# Patient Record
Sex: MALE | Race: WHITE | NOT HISPANIC OR LATINO | URBAN - METROPOLITAN AREA
[De-identification: names, ages, dates, MRNs, and addresses within clinical notes are randomized per-mention and may not be internally consistent; named-entity substitution may affect disease eponyms.]

---

## 2023-01-01 VITALS
RESPIRATION RATE: 15 BRPM | OXYGEN SATURATION: 92 % | HEIGHT: 70 IN | SYSTOLIC BLOOD PRESSURE: 147 MMHG | TEMPERATURE: 98 F | HEART RATE: 97 BPM | WEIGHT: 240.08 LBS | DIASTOLIC BLOOD PRESSURE: 78 MMHG

## 2023-01-01 LAB
ALBUMIN SERPL ELPH-MCNC: 3.8 G/DL — SIGNIFICANT CHANGE UP (ref 3.4–5)
ALP SERPL-CCNC: 69 U/L — SIGNIFICANT CHANGE UP (ref 40–120)
ALT FLD-CCNC: 14 U/L — SIGNIFICANT CHANGE UP (ref 12–42)
ANION GAP SERPL CALC-SCNC: 13 MMOL/L — SIGNIFICANT CHANGE UP (ref 9–16)
APPEARANCE UR: CLEAR — SIGNIFICANT CHANGE UP
APTT BLD: 49.9 SEC — HIGH (ref 27.5–35.5)
AST SERPL-CCNC: 29 U/L — SIGNIFICANT CHANGE UP (ref 15–37)
BACTERIA # UR AUTO: PRESENT /HPF
BASOPHILS # BLD AUTO: 0.05 K/UL — SIGNIFICANT CHANGE UP (ref 0–0.2)
BASOPHILS NFR BLD AUTO: 0.6 % — SIGNIFICANT CHANGE UP (ref 0–2)
BILIRUB SERPL-MCNC: 1 MG/DL — SIGNIFICANT CHANGE UP (ref 0.2–1.2)
BILIRUB UR-MCNC: NEGATIVE — SIGNIFICANT CHANGE UP
BUN SERPL-MCNC: 23 MG/DL — SIGNIFICANT CHANGE UP (ref 7–23)
CALCIUM SERPL-MCNC: 9.5 MG/DL — SIGNIFICANT CHANGE UP (ref 8.5–10.5)
CHLORIDE SERPL-SCNC: 103 MMOL/L — SIGNIFICANT CHANGE UP (ref 96–108)
CK SERPL-CCNC: 1785 U/L — HIGH (ref 39–308)
CO2 SERPL-SCNC: 24 MMOL/L — SIGNIFICANT CHANGE UP (ref 22–31)
COLOR SPEC: YELLOW — SIGNIFICANT CHANGE UP
CREAT SERPL-MCNC: 1.28 MG/DL — SIGNIFICANT CHANGE UP (ref 0.5–1.3)
DIFF PNL FLD: ABNORMAL
EGFR: 59 ML/MIN/1.73M2 — LOW
EOSINOPHIL # BLD AUTO: 0.11 K/UL — SIGNIFICANT CHANGE UP (ref 0–0.5)
EOSINOPHIL NFR BLD AUTO: 1.3 % — SIGNIFICANT CHANGE UP (ref 0–6)
ETHANOL SERPL-MCNC: <3 MG/DL — SIGNIFICANT CHANGE UP
FLUAV AG NPH QL: SIGNIFICANT CHANGE UP
FLUBV AG NPH QL: SIGNIFICANT CHANGE UP
GLUCOSE SERPL-MCNC: 129 MG/DL — HIGH (ref 70–99)
GLUCOSE UR QL: NEGATIVE — SIGNIFICANT CHANGE UP
GRAN CASTS # UR COMP ASSIST: ABNORMAL /LPF
HCT VFR BLD CALC: 41.4 % — SIGNIFICANT CHANGE UP (ref 39–50)
HGB BLD-MCNC: 14 G/DL — SIGNIFICANT CHANGE UP (ref 13–17)
IMM GRANULOCYTES NFR BLD AUTO: 0.9 % — SIGNIFICANT CHANGE UP (ref 0–0.9)
INR BLD: 1.43 — HIGH (ref 0.88–1.16)
KETONES UR-MCNC: 15 MG/DL
LACTATE SERPL-SCNC: 1.9 MMOL/L — SIGNIFICANT CHANGE UP (ref 0.4–2)
LEUKOCYTE ESTERASE UR-ACNC: NEGATIVE — SIGNIFICANT CHANGE UP
LYMPHOCYTES # BLD AUTO: 0.51 K/UL — LOW (ref 1–3.3)
LYMPHOCYTES # BLD AUTO: 6.3 % — LOW (ref 13–44)
MAGNESIUM SERPL-MCNC: 2.2 MG/DL — SIGNIFICANT CHANGE UP (ref 1.6–2.6)
MANUAL SMEAR VERIFICATION: SIGNIFICANT CHANGE UP
MCHC RBC-ENTMCNC: 29.4 PG — SIGNIFICANT CHANGE UP (ref 27–34)
MCHC RBC-ENTMCNC: 33.8 GM/DL — SIGNIFICANT CHANGE UP (ref 32–36)
MCV RBC AUTO: 86.8 FL — SIGNIFICANT CHANGE UP (ref 80–100)
MONOCYTES # BLD AUTO: 0.89 K/UL — SIGNIFICANT CHANGE UP (ref 0–0.9)
MONOCYTES NFR BLD AUTO: 10.9 % — SIGNIFICANT CHANGE UP (ref 2–14)
NEUTROPHILS # BLD AUTO: 6.53 K/UL — SIGNIFICANT CHANGE UP (ref 1.8–7.4)
NEUTROPHILS NFR BLD AUTO: 80 % — HIGH (ref 43–77)
NITRITE UR-MCNC: NEGATIVE — SIGNIFICANT CHANGE UP
NRBC # BLD: 0 /100 WBCS — SIGNIFICANT CHANGE UP (ref 0–0)
NT-PROBNP SERPL-SCNC: 2634 PG/ML — HIGH
PH UR: 5.5 — SIGNIFICANT CHANGE UP (ref 5–8)
PLAT MORPH BLD: NORMAL — SIGNIFICANT CHANGE UP
PLATELET # BLD AUTO: 168 K/UL — SIGNIFICANT CHANGE UP (ref 150–400)
POTASSIUM SERPL-MCNC: 3.6 MMOL/L — SIGNIFICANT CHANGE UP (ref 3.5–5.3)
POTASSIUM SERPL-SCNC: 3.6 MMOL/L — SIGNIFICANT CHANGE UP (ref 3.5–5.3)
PROT SERPL-MCNC: 7.8 G/DL — SIGNIFICANT CHANGE UP (ref 6.4–8.2)
PROT UR-MCNC: 100 MG/DL
PROTHROM AB SERPL-ACNC: 16.7 SEC — HIGH (ref 10.5–13.4)
RBC # BLD: 4.77 M/UL — SIGNIFICANT CHANGE UP (ref 4.2–5.8)
RBC # FLD: 13.2 % — SIGNIFICANT CHANGE UP (ref 10.3–14.5)
RBC BLD AUTO: NORMAL — SIGNIFICANT CHANGE UP
RBC CASTS # UR COMP ASSIST: < 5 /HPF — SIGNIFICANT CHANGE UP
RSV RNA NPH QL NAA+NON-PROBE: SIGNIFICANT CHANGE UP
SARS-COV-2 RNA SPEC QL NAA+PROBE: DETECTED
SODIUM SERPL-SCNC: 140 MMOL/L — SIGNIFICANT CHANGE UP (ref 132–145)
SP GR SPEC: >=1.03 — SIGNIFICANT CHANGE UP (ref 1–1.03)
TROPONIN I, HIGH SENSITIVITY RESULT: 167 NG/L — HIGH
UROBILINOGEN FLD QL: 1 E.U./DL — SIGNIFICANT CHANGE UP
WBC # BLD: 8.16 K/UL — SIGNIFICANT CHANGE UP (ref 3.8–10.5)
WBC # FLD AUTO: 8.16 K/UL — SIGNIFICANT CHANGE UP (ref 3.8–10.5)
WBC UR QL: < 5 /HPF — SIGNIFICANT CHANGE UP

## 2023-01-01 PROCEDURE — 72125 CT NECK SPINE W/O DYE: CPT | Mod: 26,MH

## 2023-01-01 PROCEDURE — 74176 CT ABD & PELVIS W/O CONTRAST: CPT | Mod: 26,MH

## 2023-01-01 PROCEDURE — 70450 CT HEAD/BRAIN W/O DYE: CPT | Mod: 26,MH

## 2023-01-01 PROCEDURE — 99285 EMERGENCY DEPT VISIT HI MDM: CPT | Mod: CS

## 2023-01-01 PROCEDURE — 70486 CT MAXILLOFACIAL W/O DYE: CPT | Mod: 26,MH

## 2023-01-01 NOTE — ED ADULT NURSE NOTE - NSINTERVENTIONOPT_GEN_ALL_ED
Discharge Call Back    Person Contacted: mother    Juan Arroyo. This is Zulay Lovell RN calling from [facility].    Melba Lopez NP wanted me to call you to see how you are doing.    Patient Condition: Improved    Did your discharge instructions answer all your questions yes    If applicable, have you made a follow-up appointment or received a call for follow up? No    Recommendation: follow-up as needed    Do you have any questions about your care in the Urgent Care, pain control or discharge instructions? No     Hourly Rounding/Enhanced Supervision

## 2023-01-01 NOTE — ED ADULT NURSE NOTE - NSIMPLEMENTINTERV_GEN_ALL_ED
Implemented All Fall with Harm Risk Interventions:  [place] to call system. Call bell, personal items and telephone within reach. Instruct patient to call for assistance. Room bathroom lighting operational. Non-slip footwear when patient is off stretcher. Physically safe environment: no spills, clutter or unnecessary equipment. Stretcher in lowest position, wheels locked, appropriate side rails in place. Provide visual cue, wrist band, yellow gown, etc. Monitor gait and stability. Monitor for mental status changes and reorient to person, place, and time. Review medications for side effects contributing to fall risk. Reinforce activity limits and safety measures with patient and family. Provide visual clues: red socks.

## 2023-01-01 NOTE — ED ADULT TRIAGE NOTE - CHIEF COMPLAINT QUOTE
Pt brought in by EMS for complaint of a fall about 5 hours ago stating his legs gave out and then he couldn't get up. PT reports discomfort to his back and weakness to both his knees downward. Pt reports history of afib and a stroke (7 years ago) and currently on Eliquis. Pt reports hitting the bridge of his nose and his forehead. Denies LOC.

## 2023-01-01 NOTE — ED ADULT NURSE NOTE - OBJECTIVE STATEMENT
fall approx 5 hours ago stating his legs gave out and then he couldn't get up from sitting position. PT reports discomfort to his back and weakness to both his knees downward. Eliquis fro afib. redness noted to bridge of nose and his forehead. Denies head strike /loc/alcohol and drug use

## 2023-01-02 ENCOUNTER — INPATIENT (INPATIENT)
Facility: HOSPITAL | Age: 73
LOS: 2 days | Discharge: HOME | DRG: 871 | End: 2023-01-05
Attending: INTERNAL MEDICINE | Admitting: GENERAL ACUTE CARE HOSPITAL
Payer: MEDICARE

## 2023-01-02 DIAGNOSIS — E11.9 TYPE 2 DIABETES MELLITUS WITHOUT COMPLICATIONS: ICD-10-CM

## 2023-01-02 DIAGNOSIS — I48.20 CHRONIC ATRIAL FIBRILLATION, UNSPECIFIED: ICD-10-CM

## 2023-01-02 DIAGNOSIS — U07.1 COVID-19: ICD-10-CM
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
## 2023-01-02 DIAGNOSIS — I63.9 CEREBRAL INFARCTION, UNSPECIFIED: ICD-10-CM

## 2023-01-02 DIAGNOSIS — M62.82 RHABDOMYOLYSIS: ICD-10-CM

## 2023-01-02 DIAGNOSIS — I25.10 ATHEROSCLEROTIC HEART DISEASE OF NATIVE CORONARY ARTERY WITHOUT ANGINA PECTORIS: ICD-10-CM

## 2023-01-02 DIAGNOSIS — R63.8 OTHER SYMPTOMS AND SIGNS CONCERNING FOOD AND FLUID INTAKE: ICD-10-CM

## 2023-01-02 DIAGNOSIS — I10 ESSENTIAL (PRIMARY) HYPERTENSION: ICD-10-CM

## 2023-01-02 LAB
A1C WITH ESTIMATED AVERAGE GLUCOSE RESULT: 5.9 % — HIGH (ref 4–5.6)
ALBUMIN SERPL ELPH-MCNC: 3.9 G/DL — SIGNIFICANT CHANGE UP (ref 3.3–5)
ALP SERPL-CCNC: 65 U/L — SIGNIFICANT CHANGE UP (ref 40–120)
ALT FLD-CCNC: 12 U/L — SIGNIFICANT CHANGE UP (ref 10–45)
ANION GAP SERPL CALC-SCNC: 13 MMOL/L — SIGNIFICANT CHANGE UP (ref 5–17)
AST SERPL-CCNC: 54 U/L — HIGH (ref 10–40)
BASOPHILS # BLD AUTO: 0.05 K/UL — SIGNIFICANT CHANGE UP (ref 0–0.2)
BASOPHILS NFR BLD AUTO: 0.8 % — SIGNIFICANT CHANGE UP (ref 0–2)
BILIRUB SERPL-MCNC: 0.8 MG/DL — SIGNIFICANT CHANGE UP (ref 0.2–1.2)
BUN SERPL-MCNC: 21 MG/DL — SIGNIFICANT CHANGE UP (ref 7–23)
CALCIUM SERPL-MCNC: 8.9 MG/DL — SIGNIFICANT CHANGE UP (ref 8.4–10.5)
CHLORIDE SERPL-SCNC: 103 MMOL/L — SIGNIFICANT CHANGE UP (ref 96–108)
CK MB BLD-MCNC: 0.14 % — SIGNIFICANT CHANGE UP
CK MB CFR SERPL CALC: 2.2 NG/ML — SIGNIFICANT CHANGE UP (ref 0.5–3.6)
CK MB CFR SERPL CALC: 7.5 NG/ML — HIGH (ref 0–6.7)
CK MB CFR SERPL CALC: 8 NG/ML — HIGH (ref 0–6.7)
CK SERPL-CCNC: 1603 U/L — HIGH (ref 39–308)
CK SERPL-CCNC: 4285 U/L — HIGH (ref 30–200)
CK SERPL-CCNC: 4947 U/L — HIGH (ref 30–200)
CK SERPL-CCNC: 5194 U/L — HIGH (ref 30–200)
CO2 SERPL-SCNC: 22 MMOL/L — SIGNIFICANT CHANGE UP (ref 22–31)
CREAT SERPL-MCNC: 1.04 MG/DL — SIGNIFICANT CHANGE UP (ref 0.5–1.3)
CRP SERPL-MCNC: 37.6 MG/L — HIGH (ref 0–4)
CULTURE RESULTS: SIGNIFICANT CHANGE UP
D DIMER BLD IA.RAPID-MCNC: 311 NG/ML DDU — HIGH
EGFR: 76 ML/MIN/1.73M2 — SIGNIFICANT CHANGE UP
EOSINOPHIL # BLD AUTO: 0.12 K/UL — SIGNIFICANT CHANGE UP (ref 0–0.5)
EOSINOPHIL NFR BLD AUTO: 1.9 % — SIGNIFICANT CHANGE UP (ref 0–6)
ESTIMATED AVERAGE GLUCOSE: 123 MG/DL — HIGH (ref 68–114)
FERRITIN SERPL-MCNC: 290 NG/ML — SIGNIFICANT CHANGE UP (ref 30–400)
GLUCOSE BLDC GLUCOMTR-MCNC: 84 MG/DL — SIGNIFICANT CHANGE UP (ref 70–99)
GLUCOSE BLDC GLUCOMTR-MCNC: 94 MG/DL — SIGNIFICANT CHANGE UP (ref 70–99)
GLUCOSE BLDC GLUCOMTR-MCNC: 98 MG/DL — SIGNIFICANT CHANGE UP (ref 70–99)
GLUCOSE BLDC GLUCOMTR-MCNC: 99 MG/DL — SIGNIFICANT CHANGE UP (ref 70–99)
GLUCOSE SERPL-MCNC: 99 MG/DL — SIGNIFICANT CHANGE UP (ref 70–99)
HCT VFR BLD CALC: 40 % — SIGNIFICANT CHANGE UP (ref 39–50)
HGB BLD-MCNC: 13.3 G/DL — SIGNIFICANT CHANGE UP (ref 13–17)
IMM GRANULOCYTES NFR BLD AUTO: 0.6 % — SIGNIFICANT CHANGE UP (ref 0–0.9)
LYMPHOCYTES # BLD AUTO: 1.31 K/UL — SIGNIFICANT CHANGE UP (ref 1–3.3)
LYMPHOCYTES # BLD AUTO: 20.3 % — SIGNIFICANT CHANGE UP (ref 13–44)
MAGNESIUM SERPL-MCNC: 2 MG/DL — SIGNIFICANT CHANGE UP (ref 1.6–2.6)
MCHC RBC-ENTMCNC: 29 PG — SIGNIFICANT CHANGE UP (ref 27–34)
MCHC RBC-ENTMCNC: 33.3 GM/DL — SIGNIFICANT CHANGE UP (ref 32–36)
MCV RBC AUTO: 87.3 FL — SIGNIFICANT CHANGE UP (ref 80–100)
MONOCYTES # BLD AUTO: 1.09 K/UL — HIGH (ref 0–0.9)
MONOCYTES NFR BLD AUTO: 16.9 % — HIGH (ref 2–14)
NEUTROPHILS # BLD AUTO: 3.85 K/UL — SIGNIFICANT CHANGE UP (ref 1.8–7.4)
NEUTROPHILS NFR BLD AUTO: 59.5 % — SIGNIFICANT CHANGE UP (ref 43–77)
NRBC # BLD: 0 /100 WBCS — SIGNIFICANT CHANGE UP (ref 0–0)
PHOSPHATE SERPL-MCNC: 3 MG/DL — SIGNIFICANT CHANGE UP (ref 2.5–4.5)
PLATELET # BLD AUTO: 163 K/UL — SIGNIFICANT CHANGE UP (ref 150–400)
POTASSIUM SERPL-MCNC: 3.4 MMOL/L — LOW (ref 3.5–5.3)
POTASSIUM SERPL-SCNC: 3.4 MMOL/L — LOW (ref 3.5–5.3)
PROT SERPL-MCNC: 7.1 G/DL — SIGNIFICANT CHANGE UP (ref 6–8.3)
RBC # BLD: 4.58 M/UL — SIGNIFICANT CHANGE UP (ref 4.2–5.8)
RBC # FLD: 13.2 % — SIGNIFICANT CHANGE UP (ref 10.3–14.5)
SODIUM SERPL-SCNC: 138 MMOL/L — SIGNIFICANT CHANGE UP (ref 135–145)
SPECIMEN SOURCE: SIGNIFICANT CHANGE UP
TROPONIN I, HIGH SENSITIVITY RESULT: 391.3 NG/L — HIGH
TROPONIN T SERPL-MCNC: 0.02 NG/ML — HIGH (ref 0–0.01)
TROPONIN T SERPL-MCNC: 0.03 NG/ML — HIGH (ref 0–0.01)
WBC # BLD: 6.46 K/UL — SIGNIFICANT CHANGE UP (ref 3.8–10.5)
WBC # FLD AUTO: 6.46 K/UL — SIGNIFICANT CHANGE UP (ref 3.8–10.5)

## 2023-01-02 PROCEDURE — 99223 1ST HOSP IP/OBS HIGH 75: CPT | Mod: GC

## 2023-01-02 PROCEDURE — 70551 MRI BRAIN STEM W/O DYE: CPT | Mod: 26

## 2023-01-02 RX ORDER — SODIUM CHLORIDE 9 MG/ML
1000 INJECTION, SOLUTION INTRAVENOUS
Refills: 0 | Status: DISCONTINUED | OUTPATIENT
Start: 2023-01-03 | End: 2023-01-03

## 2023-01-02 RX ORDER — SODIUM CHLORIDE 9 MG/ML
1000 INJECTION INTRAMUSCULAR; INTRAVENOUS; SUBCUTANEOUS ONCE
Refills: 0 | Status: COMPLETED | OUTPATIENT
Start: 2023-01-02 | End: 2023-01-02

## 2023-01-02 RX ORDER — AMLODIPINE BESYLATE AND BENAZEPRIL HYDROCHLORIDE 10; 20 MG/1; MG/1
1 CAPSULE ORAL
Qty: 0 | Refills: 0 | DISCHARGE

## 2023-01-02 RX ORDER — ACETAMINOPHEN 500 MG
650 TABLET ORAL EVERY 6 HOURS
Refills: 0 | Status: DISCONTINUED | OUTPATIENT
Start: 2023-01-02 | End: 2023-01-05

## 2023-01-02 RX ORDER — SODIUM CHLORIDE 9 MG/ML
1000 INJECTION, SOLUTION INTRAVENOUS
Refills: 0 | Status: DISCONTINUED | OUTPATIENT
Start: 2023-01-02 | End: 2023-01-05

## 2023-01-02 RX ORDER — METFORMIN HYDROCHLORIDE 850 MG/1
1 TABLET ORAL
Qty: 0 | Refills: 0 | DISCHARGE

## 2023-01-02 RX ORDER — CARVEDILOL PHOSPHATE 80 MG/1
1 CAPSULE, EXTENDED RELEASE ORAL
Qty: 0 | Refills: 0 | DISCHARGE

## 2023-01-02 RX ORDER — POTASSIUM CHLORIDE 20 MEQ
40 PACKET (EA) ORAL ONCE
Refills: 0 | Status: COMPLETED | OUTPATIENT
Start: 2023-01-02 | End: 2023-01-02

## 2023-01-02 RX ORDER — DEXTROSE 50 % IN WATER 50 %
15 SYRINGE (ML) INTRAVENOUS ONCE
Refills: 0 | Status: DISCONTINUED | OUTPATIENT
Start: 2023-01-02 | End: 2023-01-05

## 2023-01-02 RX ORDER — ATORVASTATIN CALCIUM 80 MG/1
10 TABLET, FILM COATED ORAL AT BEDTIME
Refills: 0 | Status: DISCONTINUED | OUTPATIENT
Start: 2023-01-02 | End: 2023-01-03

## 2023-01-02 RX ORDER — SODIUM CHLORIDE 9 MG/ML
1000 INJECTION, SOLUTION INTRAVENOUS
Refills: 0 | Status: DISCONTINUED | OUTPATIENT
Start: 2023-01-02 | End: 2023-01-02

## 2023-01-02 RX ORDER — CLOPIDOGREL BISULFATE 75 MG/1
75 TABLET, FILM COATED ORAL DAILY
Refills: 0 | Status: DISCONTINUED | OUTPATIENT
Start: 2023-01-02 | End: 2023-01-05

## 2023-01-02 RX ORDER — DEXTROSE 50 % IN WATER 50 %
12.5 SYRINGE (ML) INTRAVENOUS ONCE
Refills: 0 | Status: DISCONTINUED | OUTPATIENT
Start: 2023-01-02 | End: 2023-01-05

## 2023-01-02 RX ORDER — ACETAMINOPHEN 500 MG
975 TABLET ORAL ONCE
Refills: 0 | Status: COMPLETED | OUTPATIENT
Start: 2023-01-02 | End: 2023-01-02

## 2023-01-02 RX ORDER — ENOXAPARIN SODIUM 100 MG/ML
40 INJECTION SUBCUTANEOUS EVERY 24 HOURS
Refills: 0 | Status: DISCONTINUED | OUTPATIENT
Start: 2023-01-02 | End: 2023-01-02

## 2023-01-02 RX ORDER — DEXTROSE 50 % IN WATER 50 %
25 SYRINGE (ML) INTRAVENOUS ONCE
Refills: 0 | Status: DISCONTINUED | OUTPATIENT
Start: 2023-01-02 | End: 2023-01-05

## 2023-01-02 RX ORDER — CLOPIDOGREL BISULFATE 75 MG/1
1 TABLET, FILM COATED ORAL
Qty: 0 | Refills: 0 | DISCHARGE

## 2023-01-02 RX ORDER — FUROSEMIDE 40 MG
1 TABLET ORAL
Qty: 0 | Refills: 0 | DISCHARGE

## 2023-01-02 RX ORDER — CARVEDILOL PHOSPHATE 80 MG/1
25 CAPSULE, EXTENDED RELEASE ORAL ONCE
Refills: 0 | Status: DISCONTINUED | OUTPATIENT
Start: 2023-01-02 | End: 2023-01-02

## 2023-01-02 RX ORDER — CARVEDILOL PHOSPHATE 80 MG/1
12.5 CAPSULE, EXTENDED RELEASE ORAL ONCE
Refills: 0 | Status: COMPLETED | OUTPATIENT
Start: 2023-01-02 | End: 2023-01-02

## 2023-01-02 RX ORDER — INFLUENZA VIRUS VACCINE 15; 15; 15; 15 UG/.5ML; UG/.5ML; UG/.5ML; UG/.5ML
0.7 SUSPENSION INTRAMUSCULAR ONCE
Refills: 0 | Status: DISCONTINUED | OUTPATIENT
Start: 2023-01-02 | End: 2023-01-05

## 2023-01-02 RX ORDER — APIXABAN 2.5 MG/1
1 TABLET, FILM COATED ORAL
Qty: 0 | Refills: 0 | DISCHARGE

## 2023-01-02 RX ORDER — APIXABAN 2.5 MG/1
5 TABLET, FILM COATED ORAL EVERY 12 HOURS
Refills: 0 | Status: DISCONTINUED | OUTPATIENT
Start: 2023-01-02 | End: 2023-01-05

## 2023-01-02 RX ORDER — SODIUM CHLORIDE 9 MG/ML
1000 INJECTION, SOLUTION INTRAVENOUS
Refills: 0 | Status: DISCONTINUED | OUTPATIENT
Start: 2023-01-02 | End: 2023-01-03

## 2023-01-02 RX ORDER — GLUCAGON INJECTION, SOLUTION 0.5 MG/.1ML
1 INJECTION, SOLUTION SUBCUTANEOUS ONCE
Refills: 0 | Status: DISCONTINUED | OUTPATIENT
Start: 2023-01-02 | End: 2023-01-05

## 2023-01-02 RX ADMIN — CARVEDILOL PHOSPHATE 12.5 MILLIGRAM(S): 80 CAPSULE, EXTENDED RELEASE ORAL at 01:37

## 2023-01-02 RX ADMIN — APIXABAN 5 MILLIGRAM(S): 2.5 TABLET, FILM COATED ORAL at 19:39

## 2023-01-02 RX ADMIN — SODIUM CHLORIDE 75 MILLILITER(S): 9 INJECTION, SOLUTION INTRAVENOUS at 09:24

## 2023-01-02 RX ADMIN — SODIUM CHLORIDE 500 MILLILITER(S): 9 INJECTION INTRAMUSCULAR; INTRAVENOUS; SUBCUTANEOUS at 00:58

## 2023-01-02 RX ADMIN — Medication 975 MILLIGRAM(S): at 00:40

## 2023-01-02 RX ADMIN — ATORVASTATIN CALCIUM 10 MILLIGRAM(S): 80 TABLET, FILM COATED ORAL at 21:43

## 2023-01-02 RX ADMIN — CLOPIDOGREL BISULFATE 75 MILLIGRAM(S): 75 TABLET, FILM COATED ORAL at 21:43

## 2023-01-02 RX ADMIN — ENOXAPARIN SODIUM 40 MILLIGRAM(S): 100 INJECTION SUBCUTANEOUS at 07:57

## 2023-01-02 RX ADMIN — Medication 40 MILLIEQUIVALENT(S): at 07:55

## 2023-01-02 RX ADMIN — SODIUM CHLORIDE 125 MILLILITER(S): 9 INJECTION, SOLUTION INTRAVENOUS at 11:53

## 2023-01-02 NOTE — H&P ADULT - ASSESSMENT
73 y/o Male PMHx CVA (R MCA), HTN, HLD, Afib (on eliquis), CAD s/p stent (2014), ?CHF (on lasix) who presents after a fall. Found to have sepsis 2/2 COVID along with rhabdomyolysis. Admitted to [facility] for further management. 73 y/o Male PMHx CVA (R MCA in 2013), HTN, HLD, Afib (on eliquis), CAD s/p stent (2014), ?CHF (on lasix) who presents after a fall. Found to have sepsis 2/2 COVID along with rhabdomyolysis. Admitted to [facility] for further management. 73 y/o Male PMHx CVA (R MCA in 2013), HTN, HLD, Afib (on eliquis), CAD s/p stent (2014), ?CHF (on lasix) who presents after a fall. Found to have sepsis 2/2 COVID along with rhabdomyolysis after a fall. Admitted to [facility] for further management.

## 2023-01-02 NOTE — ED PROVIDER NOTE - ENMT NEGATIVE STATEMENT, MLM
Per Padmini at [facility] lab anaerobe/Aerobe culture taken on 02/12/21 grew p acnes.  Dr. Phelps notified.     Attempted to contact patient to inform of 10 day dose Augmentin and verify pharmacy.   Ears: no ear pain and no hearing problems. Nose: no nasal congestion and no nasal drainage. Mouth/Throat: no dysphagia, no hoarseness and no throat pain. Neck: no lumps, no pain, no stiffness and no swollen glands.

## 2023-01-02 NOTE — ED PROVIDER NOTE - OBJECTIVE STATEMENT
71 yo M, hx of MCA CVA, on eloquis, HTN, HLD, CHF, on carvedilol, metformin, statin, lasix, compliant, presents s/p fall. lives with sister, fully independent with no residual CVA deficits, ambulates indepenedently, presents s/p fall. per sister, fell onto buttock earlier in the day, and sat on the floor for hours, trying to get up. patient was resting against a wooden buffet furniture and he resisted calling 911. Per sister no changes in ms, no N/V/D, no syncope. patient notes feeling generalized weakness, fatigue, and no appetite since waking up this morning. denies cp, syncope, dizziness, headache, neck pain or stiffness, denies cough, fever, chills or UTI symptoms. no recent abx or admissions. PMD Dr Michael Willis. Dr Jerson Morris cardiology. 71 yo M, hx of MCA CVA, on eloquis, HTN, HLD, CHF, on carvedilol, metformin, statin, lasix, compliant, presents s/p fall. lives with sister, fully independent with no residual CVA deficits, ambulates indepenedently, presents s/p fall. per sister, fell onto buttock earlier in the day, and sat on the floor for hours, trying to get up. patient was resting against a wooden buffet furniture and he resisted calling 911. Per sister no changes in ms, no N/V/D, no syncope. patient notes feeling generalized weakness, fatigue, and no appetite since waking up this morning. denies cp, syncope, dizziness, headache, neck pain or stiffness, denies cough, fever, chills or UTI symptoms. no recent abx or admissions. PMD Dr Michael Willis. Dr Jerson Morris cardiology. from [facility]. Patient is fully vaccinated for COVID.  Per sister no recent hospital admissions last admission was in 2013 when he had the CVA.  Patient notes he is compliant with all medication. Patient denies alcohol, denies drugs or smoking.

## 2023-01-02 NOTE — H&P ADULT - PROBLEM SELECTOR PLAN 7
CVA in 2013 to the R MCA region. No gross focal deficits appreciated on exam aside from 4+/5 LUE strength testing.   - c/w plavix 75mg daily  - c/w statin therapeutic interchange CVA in 2013 to the R MCA region. No gross focal deficits appreciated on exam aside from 4+/5 LUE strength testing. Reports to taking plavix 75mg daily since his stroke, not on aspirin 81mg  - discuss plavix continuation with PCP  - c/w statin therapeutic interchange CVA in 2013 to the R MCA region. No gross focal deficits appreciated on exam aside from 4+/5 LUE strength testing. Reports to taking plavix 75mg daily and pravastatin 40mg since his stroke, not on aspirin 81mg  - c/w plavix 75mg daily  - c/w statin therapeutic interchange  - will need PCP clarification on plavix given 9 years since last stroke. He is also not on PPI.

## 2023-01-02 NOTE — H&P ADULT - PROBLEM SELECTOR PLAN 8
-147/67-78. Reports to taking coreg (cannot recall dose) and lasix 40mg. Per surescripts, also recently prescribed norvasc 5mg-benazepril 40mg daily.   - will hold for now i/s/o sepsis and normotensive pressures  - if becomes persistently hypertensive will resume coreg at 12.5mg BID  - formal med rec needed -147/67-78. Reports to taking coreg (cannot recall dose) and lasix 40mg. Per med rec at pharmacy takes amlodipine 5-40 q24h, coreg 25mg BID, and lasix 40mg daily  - will hold for now i/s/o sepsis and normotensive pressures  - if becomes persistently hypertensive will resume coreg at half-home dose -147/67-78. Reports to taking coreg 25mg BID and lasix 40mg. Per med rec at pharmacy takes amlodipine 5- benazepril 40 q24h, coreg 25mg BID, and lasix 40mg daily  - will hold for now i/s/o sepsis and normotensive pressures  - if becomes persistently hypertensive will resume coreg at half-home dose

## 2023-01-02 NOTE — H&P ADULT - ATTENDING COMMENTS
Patient is a 73 y/o Male PMHx CVA (R MCA in 2013), HTN, HLD, Afib (on eliquis), CAD s/p stent (2014), ?CHF (on lasix) who presents after a fall. Found to have sepsis 2/2 COVID along with rhabdomyolysis after a fall. Admitted to [facility] for further management.    #Sepsis 2/2 COVID-19 - patient meeting sepsis criteria, found to be COVID-19 positive. CXR shows no consolidation, UA not suggestive of UTI. Patient has had vaccine x2, will monitor and start remdesivir/decadron if SpO2 <94%.   #Rhabdomyolysis - patient with fall at home and down for at least 4 hours, now with elevated CK to 4K. Will continue fluids @125cc/hr for now. POCUS shows LV function is good, will f/u official limited TTE and monitor for fluid overload given history of CAD and ?CHF.   #Fall at home w/o LOC - CT head negative for acute pathology, May be related to dehydration from COIVD infection and sepsis. Fall precautions.   #CVA - history of CVA in past, now with worsening dysmetria of LUE. CT head negative for acute stroke. Neurology consulted, may be related to recrudesce from prior stroke. Will obtain MRI head, c/w Eliquis and statin    Additional workup and management as documented by resident above.

## 2023-01-02 NOTE — PATIENT PROFILE ADULT - FALL HARM RISK - HARM RISK INTERVENTIONS
Assistance with ambulation/Assistance OOB with selected safe patient handling equipment/Communicate Risk of Fall with Harm to all staff/Discuss with provider need for PT consult/Monitor gait and stability/Reinforce activity limits and safety measures with patient and family/Tailored Fall Risk Interventions/Visual Cue: Yellow wristband and red socks/Bed in lowest position, wheels locked, appropriate side rails in place/Call bell, personal items and telephone in reach/Instruct patient to call for assistance before getting out of bed or chair/Non-slip footwear when patient is out of bed/[place] to call system/Physically safe environment - no spills, clutter or unnecessary equipment/Purposeful Proactive Rounding/Room/bathroom lighting operational, light cord in reach

## 2023-01-02 NOTE — H&P ADULT - NSCORESITESY/N_GEN_A_CORE_RD
89y Male [facility] severe R hip pain and inability to ambulate after an unwitnessed falll. Pt is a poor historian and hx was largely obtaeind from wife at bedside. Wife reports that patient had an unwitneseed fall monday morning and has had difficulty ambulating since that time. He is unable to bear weight on the right lower extremity. Patient walks with walker. History unobtainable from patient.     PAST MEDICAL & SURGICAL HISTORY:  History of polymyalgia rheumatica: this is why patient is on chronic prednisone  HTN (hypertension)  BPH (benign prostatic hyperplasia)  Atrial fibrillation  H/O cardiac pacemaker  S/P CABG x 5    MEDICATIONS  (STANDING):  acetaminophen  IVPB .. 1000 milliGRAM(s) IV Intermittent every 6 hours  lactated ringers Bolus 500 milliLiter(s) IV Bolus once  lactated ringers. 1000 milliLiter(s) (75 mL/Hr) IV Continuous <Continuous>  lidocaine   Patch 2 Patch Transdermal daily  sodium chloride 0.9% Bolus 500 milliLiter(s) IV Bolus once    MEDICATIONS  (PRN):    Allergies    No Known Allergies    Intolerances        T(C): 37.2 (09-02-20 @ 23:00), Max: 37.8 (09-02-20 @ 10:58)  HR: 94 (09-03-20 @ 00:45) (72 - 103)  BP: 97/54 (09-03-20 @ 00:15) (86/54 - 142/63)  RR: 9 (09-03-20 @ 00:45) (9 - 36)  SpO2: 93% (09-03-20 @ 00:45) (91% - 99%)  Wt(kg): --    PE  Gen: AOx1, confused at baseline per wife (worsened as of recent)   R LE:  Skin intact; No ecchymosis/soft tissue swelling  Compartments soft; grimace with palpation about the hip and with log roll/heel strike, no grimace to palpation of the rest of the extremityh  Minimally responsive to commands and was unable to assess SLR/motor sensory  Dp 2+    LLE/ B/L UE  No grimace to bony palpation throughout ; Good ROM w/o pain; Exam Unremarkable    Imaging:  XR demonstrating Right acetabular fracture, with read showing possible femoral head fx    89y Male with acetabular fracture  - Pain control  - PT  - TTWB RLE  - No plan for operative intervention at this time  - Admit for inability to ambulate  -Outpatient follow up with attending on call, Dr. Lopez in 1-2 weeks following discharge. Please call office for an appointment.  -Ortho stable for discharge. Please reconsult as needed No

## 2023-01-02 NOTE — H&P ADULT - PROBLEM SELECTOR PLAN 3
PT eval  - plan as above PT eval  - plan as above  - fall precautions Fell on his left side w/ exam notable for dysmetria. CT head negative +chronic R MCA stroke but concern for possible new ischemia.   - stroke team recs appreciated, currently outside of window for tPA  - plan as above  - fall precautions Fell on his left side w/ exam notable for dysmetria. CT head negative +chronic R MCA stroke but uncertain about possible new ischemia.   - stroke team recs appreciated, currently outside of window for tPA  - ordered MR brain w/o (short stroke protocol)  - plan as above  - fall precautions

## 2023-01-02 NOTE — CONSULT NOTE ADULT - SUBJECTIVE AND OBJECTIVE BOX
Patient is a 72y old  Male who presents with a chief complaint of COVID, Rhabdo, Fall (2023 07:15)        HPI:  73 y/o Male PMHx CVA (R MCA in ), HTN, HLD, Afib (on eliquis), CAD s/p stent (), ?CHF (on lasix) who presents after a fall. Patient lives with his sister who reports he is fully independent. Patient reports between 2-3pm yesterday when standing upright from his chair to use the bathroom, his torso and legs gave out and landed on his left side. Says he was down for about 4 hours and had difficulty getting up to call 911. He waited for his sister (who lives with him) to return to the apartment before bringing him to the hospital. Prior to and during the fall, he denied having chest pain, palpitations, and room spinning sensation. He also denies loss of consciousness and had full recollection of events. No history of prior falls. Says he is typically ambulatory and walks 1.5 miles per day. Does not use devices to assist with ambulation. Of note, has been experiencing upper nasal congestion for the past 3 weeks. No recent travels and sick contacts aside from seeing his sister on a daily basis. Reports COVID booster vaccination 2 weeks ago, but unsure if it was for the recent variant. On further ROS, he also admits to poor PO intake since yesterday morning. Last hospitalization in  for CVA.     ED Course  Vitals: 100.9F (rectal), HR 75-97, -147/67-78, 98% RA, RR 16  Labs: notable for 16.9% monocytes, K 3.4, AST 54, CRP 37.6, D-dimer 311, CK  1785 < 4285, trop-T 0.03, CKMB 7.5; BNP 2634; UA ketones 15, SG >1.03, large blood but no RBCs; +COVID  Imagin) CTH - no intracranial hemorrhage or fracture, old R MCA territory infarct  2) CT cervical - no acute cervical spine fx or traumatic subluxation, mlutilevel cervical spondylosis w/ varying degrees of central canal and neural foraminal stenosis  3) CT maxillofacial - no acute maxillofacial or mandibular fx, L maxillary sinus and ethmoid air cell mucosal thickening  4) CT abd/pelvis w/o - cholelithiasis w/o evidence of acute cholecystitis, colonic diverticulosis, small umbilical hernia and b/l inguinal hernia containing only fat  EKG: afib HR 83, QTc 465 w/ unifocal PVCs  Management: 1L NS bolus, APAP 975mg PO x1, coreg 12.5mg PO x1    ED provider discussed with cards on-call Dr. Loaiza about trop-I elev ation who believes elevation is not cardiac etiology (2023 07:15)      PAST MEDICAL & SURGICAL HISTORY:  Hypertension      CVA (cerebrovascular accident)      Diabetes      HLD (hyperlipidemia)      CAD (coronary artery disease)          MEDICATIONS  (STANDING):  apixaban 5 milliGRAM(s) Oral every 12 hours  atorvastatin 10 milliGRAM(s) Oral at bedtime  dextrose 5%. 1000 milliLiter(s) (50 mL/Hr) IV Continuous <Continuous>  dextrose 5%. 1000 milliLiter(s) (100 mL/Hr) IV Continuous <Continuous>  dextrose 50% Injectable 25 Gram(s) IV Push once  dextrose 50% Injectable 12.5 Gram(s) IV Push once  dextrose 50% Injectable 25 Gram(s) IV Push once  glucagon  Injectable 1 milliGRAM(s) IntraMuscular once  influenza  Vaccine (HIGH DOSE) 0.7 milliLiter(s) IntraMuscular once  lactated ringers. 1000 milliLiter(s) (75 mL/Hr) IV Continuous <Continuous>    MEDICATIONS  (PRN):  acetaminophen     Tablet .. 650 milliGRAM(s) Oral every 6 hours PRN Temp greater or equal to 38C (100.4F), Mild Pain (1 - 3)  dextrose Oral Gel 15 Gram(s) Oral once PRN Blood Glucose LESS THAN 70 milliGRAM(s)/deciliter       FAMILY HISTORY:    CBC Full  -  ( 2023 05:09 )  WBC Count : 6.46 K/uL  RBC Count : 4.58 M/uL  Hemoglobin : 13.3 g/dL  Hematocrit : 40.0 %  Platelet Count - Automated : 163 K/uL  Mean Cell Volume : 87.3 fl  Mean Cell Hemoglobin : 29.0 pg  Mean Cell Hemoglobin Concentration : 33.3 gm/dL  Auto Neutrophil # : 3.85 K/uL  Auto Lymphocyte # : 1.31 K/uL  Auto Monocyte # : 1.09 K/uL  Auto Eosinophil # : 0.12 K/uL  Auto Basophil # : 0.05 K/uL  Auto Neutrophil % : 59.5 %  Auto Lymphocyte % : 20.3 %  Auto Monocyte % : 16.9 %  Auto Eosinophil % : 1.9 %  Auto Basophil % : 0.8 %          138  |  103  |  21  ----------------------------<  99  3.4<L>   |  22  |  1.04    Ca    8.9      2023 05:09  Phos  3.0       Mg     2.0         TPro  7.1  /  Alb  3.9  /  TBili  0.8  /  DBili  x   /  AST  54<H>  /  ALT  12  /  AlkPhos  65        Urinalysis Basic - ( 2023 21:37 )    Color: Yellow / Appearance: Clear / SG: >=1.030 / pH: x  Gluc: x / Ketone: 15 mg/dL  / Bili: NEGATIVE / Urobili: 1.0 E.U./dL   Blood: x / Protein: 100 mg/dL / Nitrite: NEGATIVE   Leuk Esterase: NEGATIVE / RBC: < 5 /HPF / WBC < 5 /HPF   Sq Epi: x / Non Sq Epi: x / Bacteria: Present /HPF          Radiology :     < from: CT Head No Cont (23 @ 23:47) >  ACC: [id number] EXAM:  CT BRAIN                          PROCEDURE DATE:  2023          INTERPRETATION:  CT HEAD WITHOUT CONTRAST    INDICATIONS: Fall    TECHNIQUE: Serial axial images were obtained from the skull base to the   vertex without the use of intravenous contrast. Sagittal and coronal   reformatted images were created from the axial data set. Images were   reviewed in the bone, brain and subdural windows.    COMPARISON: None.    FINDINGS: Evaluation is partially limited due to motion artifact.    INTRA-AXIAL: No intracranial mass effect, acute hemorrhage, midline shift   or acute transcortical infarct is seen. Small area of   encephalomalacia/gliosis in the right insular region, consistent with   prior infarct. Mild patchy periventricular white matter hypodensities,   nonspecific but most likely the result of chronic microvascular disease..  EXTRA-AXIAL: No extra-axial fluid collection is present.  VENTRICLES AND SULCI: No hydrocephalus. Mild ex vacuo dilatation of the   right lateral ventricle and Sylvian fissure adjacent to chronic right MCA   territory infarct.  VISUALIZED SINUSES: Mucosal thickening in the left maxillary sinus and   bilateral ethmoid air cells. No air-fluid levels.  VISUALIZED MASTOIDS: Clear.  CALVARIUM: No fracture.  MISCELLANEOUS: None.    IMPRESSION:  No intracranial hemorrhage or calvarial fracture.    Old right MCA territory infarct, as above.      < from: CT Cervical Spine No Cont (23 @ 23:49) >  ACC: [id number] EXAM:  CT CERVICAL SPINE                          PROCEDURE DATE:  2023          INTERPRETATION:  PROCEDURE: CT CERVICAL SPINE WITHOUT CONTRAST    INDICATION: Fall    TECHNIQUE: Multiple axial sections were obtained from the midorbits to   the sternoclavicular joint. Sagittal and coronal reformats were obtained   from the axial data set. The images were reviewed in soft tissue and bone   windows.    COMPARISON: None    FINDINGS:    The normal cervical lordosis is maintained. Mild anterolisthesis of C5 on   C6.    The osseous structures are intact without acute fracture. The vertebral   body heights are preserved. Multilevel degenerative changes including   anterior osteophytes, endplate sclerosis, subchondral cystic change,   facet and uncovertebral arthropathy are present.    Multilevel disc degeneration, worst at C6-7. Multilevel disc bulges with   superimposed disc protrusions and posterior osseous ridging causing mild   central canal stenosis at C3-4 and mild tomoderate central canal   stenosis at C4-5. Evaluation below C4-5 is limited due to streak artifact   from shoulder positioning. Facet and uncovertebral joint arthropathy   causes varying levels of neural foraminal narrowing, severe bilaterally   at C4-5.    No prevertebral soft tissue swelling.      IMPRESSION:  No acute cervical spine fracture or traumatic subluxation.    Multilevel cervical spondylosis with varying degrees of central canal and   neural foraminal stenosis, as above.      < from: CT Abdomen and Pelvis No Cont (23 @ 23:48) >  ACC: [id number] EXAM:  CT ABDOMEN AND PELVIS                          PROCEDURE DATE:  2023    ******PRELIMINARY REPORT******      ******PRELIMINARY REPORT******           INTERPRETATION:  [facility] RADIOLOGIST PRELIMINARY REPORT      Initial report created on 2023 12:41:07 AM EST  PROCEDURE INFORMATION:  Exam: CT Abdomen And Pelvis Without Contrast  Exam date and time: 2023 11:25 PM  Age: 72 years old  Clinical indication: Fall    TECHNIQUE:  Imaging protocol: Computed tomography of the abdomen and pelvis without  contrast.    COMPARISON:  No relevant prior studies available.    FINDINGS:  Liver: Normal. No mass.  Gallbladder and bile ducts: Stones in the gallbladder. No gallbladder wall  thickening. No pericholecystic fluid.  Pancreas: Normal. No ductal dilation.  Spleen: Normal. No splenomegaly.  Adrenal glands: Normal. No mass.  Kidneys and ureters: Normal. No hydronephrosis.  Stomach and bowel: Colonic diverticulosis. No findings to suggest acute  diverticulitis. No areas of bowel wall thickening in the colon. No   evidence of  obstruction.  Appendix: Normal appendix is seen. No evidence of acute appendicitis.    Intraperitoneal space: No free fluidor fluid collections. No inflammatory  changes. No free air.  Vasculature: Unremarkable. No abdominal aortic aneurysm.  Lymph nodes: Unremarkable. No enlarged lymph nodes.  Urinary bladder: Unremarkable as visualized.  Reproductive: Unremarkable as visualized.  Bones/joints: Degenerative changes in the spine. No evidence of fracture.  Degenerative changes in the spine. No evidence of fracture.  Soft tissues: Small umbilical hernia containing only fat. No evidence of   bowel  herniation. No umbilical fluid collection. Bilateral inguinal hernias  containing only fat.    IMPRESSION:  1. No evidence of visceral organ or vascular injury.  2. Cholelithiasis without evidence of acute cholecystitis.  3. Colonic diverticulosis.No pericolonic inflammatory changes to suggest   acute  diverticulitis.  4. Small umbilical hernia containing only fat. No evidence of bowel   herniation.  No umbilical fluid collection.  5. Bilateral inguinal hernias containing only fat.                  Vital Signs Last 24 Hrs  T(C): 36.6 (2023 05:08), Max: 38.3 (2023 23:41)  T(F): 97.8 (2023 05:08), Max: 100.9 (2023 23:41)  HR: 67 (2023 05:08) (67 - 97)  BP: 106/58 (2023 05:08) (106/58 - 147/78)  BP(mean): --  RR: 17 (2023 05:08) (15 - 18)  SpO2: 97% (2023 05:08) (92% - 98%)    Parameters below as of 2023 05:08  Patient On (Oxygen Delivery Method): room air            REVIEW OF SYSTEMS:  per hpi         Physical Exam:  on COVID isolation , in accordance with current standards limiting patient contact , please refer to exam performed on 2023      Head: mild bruising at the glabella   Eyes: PERRL, EOMI, anicteric sclera  ENT: dry mucus membranes, intermittent dry cough  Neck: supple; no JVD   Respiratory: CTA B/L; no W/R/R  Cardiac: +S1/S2; irregularly irregular with HR 60s; no M/R/G  Gastrointestinal: soft, NT/ND; no rebound or guarding; +BSx4  Back: spine midline, no bony tenderness  Extremities: WWP, b/l nonpitting edema with chronic venous stasis changes, b/l knee and L lateral malleoli skin tear s/p falls  Musculoskeletal: NROM x4; no joint swelling, tenderness or erythema; 5/5 strength testing of RUE/LLE/RLE but 4/5 LUE  Vascular: 2+ radial,  DP/PT pulses B/L  Dermatologic: warm and dry, skin findings as noted in extremity section above  Neurologic: AAOx3; +hard of hearing  Psychiatric: affect and characteristics of appearance, verbalizations, behaviors are appropriate          PM&R Impression :     1) deconditioned    2) no focal weakness      Recommendations / Plan :     1) Physical / Occupational therapy focusing on therapeutic exercises , equipment evaluation , bed mobility/transfer out of bed evaluation , progressive ambulation with assistive devices prn .    2) Current disposition plan recommendation  : pending functional progress

## 2023-01-02 NOTE — ED PROVIDER NOTE - SKIN WOUND TYPE
Patient with superficial abrasions to bilateral patellas, no streaking region demarcated.  Superficial and scant linear abrasion or ecchymosis to upper back, bilateral posterior olecranon regions with erythema with scant abrasion.  No streaking no crepitus patient has full range of motion motion to both elbows, no deformities, full range of motion to both lower knees, with no deformities and no edema.

## 2023-01-02 NOTE — H&P ADULT - NSHPSOCIALHISTORY_GEN_ALL_CORE
Prior smoker, quit 1993. Denies alcohol and recreational drug use. Lives with sister, reports to being independent of ADLs.

## 2023-01-02 NOTE — CONSULT NOTE ADULT - SUBJECTIVE AND OBJECTIVE BOX
**STROKE CONSULT NOTE**    Last known well time/Time of onset of symptoms:    HPI: 72y Male with PMHx of     T(C): 36.6 (01-02-23 @ 05:08), Max: 38.3 (01-01-23 @ 23:41)  HR: 67 (01-02-23 @ 05:08) (67 - 97)  BP: 106/58 (01-02-23 @ 05:08) (106/58 - 147/78)  RR: 17 (01-02-23 @ 05:08) (15 - 18)  SpO2: 97% (01-02-23 @ 05:08) (92% - 98%)    PAST MEDICAL & SURGICAL HISTORY:  Hypertension      CVA (cerebrovascular accident)      Diabetes      HLD (hyperlipidemia)      CAD (coronary artery disease)          FAMILY HISTORY:      SOCIAL HISTORY:   Patient lives alone    ROS:   Constitutional: No fever, weight loss or fatigue  Eyes: No eye pain, visual disturbances, or discharge  ENMT:  No difficulty hearing, tinnitus; No sinus or throat pain  Neck: No pain or stiffness  Respiratory: No cough, wheezing, chills or hemoptysis  Cardiovascular: No chest pain, palpitations, shortness of breath, or leg swelling  Gastrointestinal: No abdominal pain. No nausea, vomiting or hematemesis; No diarrhea or constipation. Nohematochezia.  Genitourinary: No dysuria, frequency, hematuria or incontinence  Neurological: As per HPI  Skin: No itching, burning, rashes or lesions   Endocrine: No heat or cold intolerance; No hair loss  Musculoskeletal: No joint pain or swelling; No muscle, back or extremity pain  Heme/Lymph: No easy bruising or bleeding gums    MEDICATIONS  (STANDING):  apixaban 5 milliGRAM(s) Oral every 12 hours  atorvastatin 10 milliGRAM(s) Oral at bedtime  clopidogrel Tablet 75 milliGRAM(s) Oral daily  dextrose 5%. 1000 milliLiter(s) (50 mL/Hr) IV Continuous <Continuous>  dextrose 5%. 1000 milliLiter(s) (100 mL/Hr) IV Continuous <Continuous>  dextrose 50% Injectable 25 Gram(s) IV Push once  dextrose 50% Injectable 12.5 Gram(s) IV Push once  dextrose 50% Injectable 25 Gram(s) IV Push once  glucagon  Injectable 1 milliGRAM(s) IntraMuscular once  influenza  Vaccine (HIGH DOSE) 0.7 milliLiter(s) IntraMuscular once  lactated ringers. 1000 milliLiter(s) (125 mL/Hr) IV Continuous <Continuous>    MEDICATIONS  (PRN):  acetaminophen     Tablet .. 650 milliGRAM(s) Oral every 6 hours PRN Temp greater or equal to 38C (100.4F), Mild Pain (1 - 3)  dextrose Oral Gel 15 Gram(s) Oral once PRN Blood Glucose LESS THAN 70 milliGRAM(s)/deciliter    Allergies    No Known Allergies    Intolerances      Vital Signs Last 24 Hrs  T(C): 36.6 (02 Jan 2023 05:08), Max: 38.3 (01 Jan 2023 23:41)  T(F): 97.8 (02 Jan 2023 05:08), Max: 100.9 (01 Jan 2023 23:41)  HR: 67 (02 Jan 2023 05:08) (67 - 97)  BP: 106/58 (02 Jan 2023 05:08) (106/58 - 147/78)  BP(mean): --  RR: 17 (02 Jan 2023 05:08) (15 - 18)  SpO2: 97% (02 Jan 2023 05:08) (92% - 98%)    Parameters below as of 02 Jan 2023 05:08  Patient On (Oxygen Delivery Method): room air        Physical exam:  Constitutional: No acute distress, conversant  Eyes: Anicteric sclerae, moist conjunctivae, see below for CNs  Neck: trachea midline, FROM, supple, no thyromegaly or lymphadenopathy  Cardiovascular: Regular rate and rhythm    Neurologic:  -Mental status: Awake, alert, oriented to person, age, and month. Speech is fluent with intact naming, no dysarthria. Somewhat impaired remote memory. Follows commands. Attention/concentration intact. Fund of knowledge appropriate.  -Cranial nerves:   II: Visual fields are full to confrontation.  III, IV, VI: Extraocular movements are intact without nystagmus. Pupils equally round and reactive to light  V:  Facial sensation V1-V3 equal and intact   VII: Face is symmetric   VIII: hard of hearing  Motor: Normal bulk and tone. Slight left arm drift without much pronation, says it "feels heavy" strength 4+/5. Right arm 5/5, right leg 5/5, left leg 5/5.  Sensation: Intact to light touch bilaterally. No neglect or extinction on double simultaneous testing.  Coordination: No dysmetria on finger-to-nose   Gait: Narrow gait and steady    NIHSS: 1    Fingerstick Blood Glucose: CAPILLARY BLOOD GLUCOSE      POCT Blood Glucose.: 99 mg/dL (02 Jan 2023 12:31)    LABS:                        13.3   6.46  )-----------( 163      ( 02 Jan 2023 05:09 )             40.0     01-02    138  |  103  |  21  ----------------------------<  99  3.4<L>   |  22  |  1.04    Ca    8.9      02 Jan 2023 05:09  Phos  3.0     01-02  Mg     2.0     01-02    TPro  7.1  /  Alb  3.9  /  TBili  0.8  /  DBili  x   /  AST  54<H>  /  ALT  12  /  AlkPhos  65  01-02    PT/INR - ( 01 Jan 2023 21:37 )   PT: 16.7 sec;   INR: 1.43          PTT - ( 01 Jan 2023 21:37 )  PTT:49.9 sec  CARDIAC MARKERS ( 02 Jan 2023 10:26 )  x     / 0.02 ng/mL / 5194 U/L / x     / 8.0 ng/mL  CARDIAC MARKERS ( 02 Jan 2023 05:06 )  x     / 0.03 ng/mL / 4285 U/L / x     / 7.5 ng/mL  CARDIAC MARKERS ( 02 Jan 2023 01:23 )  x     / x     / 1603 U/L / x     / 2.2 ng/mL  CARDIAC MARKERS ( 01 Jan 2023 22:57 )  x     / x     / 1785 U/L / x     / x          Urinalysis Basic - ( 01 Jan 2023 21:37 )    Color: Yellow / Appearance: Clear / SG: >=1.030 / pH: x  Gluc: x / Ketone: 15 mg/dL  / Bili: NEGATIVE / Urobili: 1.0 E.U./dL   Blood: x / Protein: 100 mg/dL / Nitrite: NEGATIVE   Leuk Esterase: NEGATIVE / RBC: < 5 /HPF / WBC < 5 /HPF   Sq Epi: x / Non Sq Epi: x / Bacteria: Present /HPF        RADIOLOGY & ADDITIONAL STUDIES:    < from: CT Head No Cont (01.01.23 @ 23:47) >  IMPRESSION:  No intracranial hemorrhage or calvarial fracture.    Old right MCA territory infarct, as above.    < end of copied text >    -----------------------------------------------------------------------------------------------------------------  IV-tPA (Y/N):    No  Reason IV-tPA not given: outside window    **STROKE CONSULT NOTE**    Last known well time/Time of onset of symptoms: yesterday afternoon    HPI: 72y Male with PMHx of CVA (R MCA in 2013 per CT scan but per pt did not have any weakness at the time), HTN, HLD, Afib (on eliquis), CAD s/p stent (2014), ?CHF (on lasix) who presented to [facility] after a fall. Pt states that his legs gave out and he fell on his left side. Says he was down for about 4 hours and had difficulty getting up to call 911. Denies any loss of consciousness or head strike. Says he is ambulatory at baseline and never had any weakness from his stroke in 2013, but that he had heart failure at the time and someone told him he also had a stroke.    Pt admitted to medicine service for SIRS from COVID and also rhabdomyolysis. Stroke team consulted for left arm weakness that was new since yesterday. Per patient, he insists that he never had weakness on his left side before, again says he was asymptomatic from his stroke in 2013. He says he has been compliant with Eliquis twice a day and he also takes daily plavix. His sister at bedside said they had tried some other stroke medication in 2013 that pt was allergic to. Pt states that now he is able to walk without a problem but his right arm still feels weird.     T(C): 36.6 (01-02-23 @ 05:08), Max: 38.3 (01-01-23 @ 23:41)  HR: 67 (01-02-23 @ 05:08) (67 - 97)  BP: 106/58 (01-02-23 @ 05:08) (106/58 - 147/78)  RR: 17 (01-02-23 @ 05:08) (15 - 18)  SpO2: 97% (01-02-23 @ 05:08) (92% - 98%)    PAST MEDICAL & SURGICAL HISTORY:  Hypertension      CVA (cerebrovascular accident)      Diabetes      HLD (hyperlipidemia)      CAD (coronary artery disease)          FAMILY HISTORY:      SOCIAL HISTORY:   Patient lives with sister    ROS:   Eyes: No eye pain, visual disturbances, or discharge  ENMT:  No difficulty hearing, tinnitus; No sinus or throat pain  Neck: No pain or stiffness  Respiratory: No cough, wheezing, chills or hemoptysis  Cardiovascular: No chest pain, palpitation  Gastrointestinal: No abdominal pain. No nausea, vomiting or hematemesis; No diarrhea or constipation. Nohematochezia.  Genitourinary: No dysuria, frequency, hematuria or incontinence  Neurological: As per HPI    MEDICATIONS  (STANDING):  apixaban 5 milliGRAM(s) Oral every 12 hours  atorvastatin 10 milliGRAM(s) Oral at bedtime  clopidogrel Tablet 75 milliGRAM(s) Oral daily  dextrose 5%. 1000 milliLiter(s) (50 mL/Hr) IV Continuous <Continuous>  dextrose 5%. 1000 milliLiter(s) (100 mL/Hr) IV Continuous <Continuous>  dextrose 50% Injectable 25 Gram(s) IV Push once  dextrose 50% Injectable 12.5 Gram(s) IV Push once  dextrose 50% Injectable 25 Gram(s) IV Push once  glucagon  Injectable 1 milliGRAM(s) IntraMuscular once  influenza  Vaccine (HIGH DOSE) 0.7 milliLiter(s) IntraMuscular once  lactated ringers. 1000 milliLiter(s) (125 mL/Hr) IV Continuous <Continuous>    MEDICATIONS  (PRN):  acetaminophen     Tablet .. 650 milliGRAM(s) Oral every 6 hours PRN Temp greater or equal to 38C (100.4F), Mild Pain (1 - 3)  dextrose Oral Gel 15 Gram(s) Oral once PRN Blood Glucose LESS THAN 70 milliGRAM(s)/deciliter    Allergies    No Known Allergies    Intolerances      Vital Signs Last 24 Hrs  T(C): 36.6 (02 Jan 2023 05:08), Max: 38.3 (01 Jan 2023 23:41)  T(F): 97.8 (02 Jan 2023 05:08), Max: 100.9 (01 Jan 2023 23:41)  HR: 67 (02 Jan 2023 05:08) (67 - 97)  BP: 106/58 (02 Jan 2023 05:08) (106/58 - 147/78)  BP(mean): --  RR: 17 (02 Jan 2023 05:08) (15 - 18)  SpO2: 97% (02 Jan 2023 05:08) (92% - 98%)    Parameters below as of 02 Jan 2023 05:08  Patient On (Oxygen Delivery Method): room air        Physical exam:  Constitutional: No acute distress, conversant  Eyes: Anicteric sclerae, moist conjunctivae, see below for CNs  Neck: trachea midline, FROM, supple, no thyromegaly or lymphadenopathy  Cardiovascular: Regular rate and rhythm    Neurologic:  -Mental status: Awake, alert, oriented to person, age, and month. Speech is fluent with intact naming, no dysarthria. Somewhat impaired remote memory. Follows commands. Attention/concentration intact. Fund of knowledge appropriate.  -Cranial nerves:   II: Visual fields are full to confrontation.  III, IV, VI: Extraocular movements are intact without nystagmus. Pupils equally round and reactive to light  V:  Facial sensation V1-V3 equal and intact   VII: Face is symmetric   VIII: hard of hearing  Motor: Normal bulk and tone. Slight left arm drift without much pronation, says it "feels heavy" strength 4+/5. Right arm 5/5, right leg 5/5, left leg 5/5.  Sensation: Intact to light touch bilaterally. No neglect or extinction on double simultaneous testing.  Coordination: No dysmetria on finger-to-nose   Gait: Narrow gait and steady    NIHSS: 1    Fingerstick Blood Glucose: CAPILLARY BLOOD GLUCOSE      POCT Blood Glucose.: 99 mg/dL (02 Jan 2023 12:31)    LABS:                        13.3   6.46  )-----------( 163      ( 02 Jan 2023 05:09 )             40.0     01-02    138  |  103  |  21  ----------------------------<  99  3.4<L>   |  22  |  1.04    Ca    8.9      02 Jan 2023 05:09  Phos  3.0     01-02  Mg     2.0     01-02    TPro  7.1  /  Alb  3.9  /  TBili  0.8  /  DBili  x   /  AST  54<H>  /  ALT  12  /  AlkPhos  65  01-02    PT/INR - ( 01 Jan 2023 21:37 )   PT: 16.7 sec;   INR: 1.43          PTT - ( 01 Jan 2023 21:37 )  PTT:49.9 sec  CARDIAC MARKERS ( 02 Jan 2023 10:26 )  x     / 0.02 ng/mL / 5194 U/L / x     / 8.0 ng/mL  CARDIAC MARKERS ( 02 Jan 2023 05:06 )  x     / 0.03 ng/mL / 4285 U/L / x     / 7.5 ng/mL  CARDIAC MARKERS ( 02 Jan 2023 01:23 )  x     / x     / 1603 U/L / x     / 2.2 ng/mL  CARDIAC MARKERS ( 01 Jan 2023 22:57 )  x     / x     / 1785 U/L / x     / x          Urinalysis Basic - ( 01 Jan 2023 21:37 )    Color: Yellow / Appearance: Clear / SG: >=1.030 / pH: x  Gluc: x / Ketone: 15 mg/dL  / Bili: NEGATIVE / Urobili: 1.0 E.U./dL   Blood: x / Protein: 100 mg/dL / Nitrite: NEGATIVE   Leuk Esterase: NEGATIVE / RBC: < 5 /HPF / WBC < 5 /HPF   Sq Epi: x / Non Sq Epi: x / Bacteria: Present /HPF        RADIOLOGY & ADDITIONAL STUDIES:    < from: CT Head No Cont (01.01.23 @ 23:47) >  IMPRESSION:  No intracranial hemorrhage or calvarial fracture.    Old right MCA territory infarct, as above.    < end of copied text >    -----------------------------------------------------------------------------------------------------------------  IV-tPA (Y/N):    No  Reason IV-tPA not given: outside window

## 2023-01-02 NOTE — ED PROVIDER NOTE - PROGRESS NOTE DETAILS
patient's trop elevated to x 2, from initial level. will call cards on call dr belle, and discuss. ckmb neg. repeat ekg x 3, is still afib with unifocal PVCs. discussed with dr belle, notes this is not cardiac in etiology, and recommends to admit to medicine. discussion made of 4th ekg with no changes other than unifocal ekg, and almost doubling of high sensitivity trop, with neg ckmb markers. patient otherwise with no pain or discomfort. denies cp. placed call to [facility] physicians group for patient's PMD Dr Michael Del Valle. will return call with on call hospitalist to discuss admission to [facility].

## 2023-01-02 NOTE — H&P ADULT - PROBLEM SELECTOR PLAN 9
F: LR @ 100cc/hr for rhabdo   E: replete K<4, Mg<2  N: Dash/TLC, consistent carb  VTE Prophylaxis: eliquis 5mg BID  GI: pantoprazole  D: RMF-COVID F: LR @ 75cc/hr for rhabdo   E: replete K<4, Mg<2  N: Dash/TLC, consistent carb  VTE Prophylaxis: eliquis 5mg BID  GI: pantoprazole  D: RMF-COVID F: LR @ 125cc/hr for rhabdo   E: replete K<4, Mg<2  N: Dash/TLC, consistent carb  VTE Prophylaxis: eliquis 5mg BID  GI: pantoprazole  D: RMF-COVID

## 2023-01-02 NOTE — H&P ADULT - NSHPOUTPATIENTPROVIDERS_GEN_ALL_CORE
PCP - Dr. Michael Pedersen  Cardiologist - Dr. Jerson Baptiste PCP - Dr. Michael Pedersen  Cardiologist - Dr. Jerson Baptiste and Dr. Cifuentes

## 2023-01-02 NOTE — H&P ADULT - PROBLEM SELECTOR PLAN 2
CK uptrending from 1785 to 4285 s/p reported fall at home. Received 1L NS bolus in the ED.   - c/w LR @100cc/hr   - f/up repeat cardiac enzymes at 10am    #Troponinemia   Trop-T elevated 0.03 on admission. EKG notable for afib w/ unifocal PVCs. Denies chest pain. Says history of stent placement in 2014. Possibly elevated i/s/o rhabdo picture above  - if persistently elevated will consult cardiology CK uptrending from 1785 to 4285 s/p reported fall at home. Received 1L NS bolus in the ED.   - c/w LR @75cc/hr   - f/up repeat cardiac enzymes at 10am    #Troponinemia   Trop-T elevated 0.03 on admission. EKG notable for afib w/ unifocal PVCs. Denies chest pain. Says history of stent placement in 2014. Possibly elevated i/s/o rhabdo picture above  - if persistently elevated will consult cardiology CK uptrending from 1785 to 4285 s/p reported fall at home. Received 1L NS bolus in the ED.   - c/w LR @125cc/hr   - f/up repeat cardiac enzymes at 10am  - frequent lung checks given ?CHF history     #Troponinemia   Trop-T elevated 0.03 on admission. EKG notable for afib w/ unifocal PVCs. Denies chest pain. Says history of stent placement in 2014. Possibly elevated i/s/o rhabdo picture above  - if persistently elevated will consult cardiology

## 2023-01-02 NOTE — ED ADULT NURSE REASSESSMENT NOTE - NS ED NURSE REASSESS COMMENT FT1
Pt care handed over to myself from sharif baxter from [phone], checked in on patient , introduced self , pt standing at room sink trying to fill his water bottle, given pt fresh bottles of water from the fridge, asked dr dorsey if she wants to chart anti febrile meds for temp, dr elone chart, gcs 15 speaking in full sentences,

## 2023-01-02 NOTE — H&P ADULT - NSHPLABSRESULTS_GEN_ALL_CORE
.  LABS                        13.3   6.46  )-----------( 163      ( 02 Jan 2023 05:09 )             40.0     01-02    138  |  103  |  21  ----------------------------<  99  3.4<L>   |  22  |  1.04    Ca    8.9      02 Jan 2023 05:09  Phos  3.0     01-02  Mg     2.0     01-02    TPro  7.1  /  Alb  3.9  /  TBili  0.8  /  DBili  x   /  AST  54<H>  /  ALT  12  /  AlkPhos  65  01-02    PT/INR - ( 01 Jan 2023 21:37 )   PT: 16.7 sec;   INR: 1.43          PTT - ( 01 Jan 2023 21:37 )  PTT:49.9 sec  Urinalysis Basic - ( 01 Jan 2023 21:37 )    Color: Yellow / Appearance: Clear / SG: >=1.030 / pH: x  Gluc: x / Ketone: 15 mg/dL  / Bili: NEGATIVE / Urobili: 1.0 E.U./dL   Blood: x / Protein: 100 mg/dL / Nitrite: NEGATIVE   Leuk Esterase: NEGATIVE / RBC: < 5 /HPF / WBC < 5 /HPF   Sq Epi: x / Non Sq Epi: x / Bacteria: Present /HPF      CARDIAC MARKERS ( 02 Jan 2023 05:06 )  x     / 0.03 ng/mL / 4285 U/L / x     / 7.5 ng/mL  CARDIAC MARKERS ( 02 Jan 2023 01:23 )  x     / x     / 1603 U/L / x     / 2.2 ng/mL  CARDIAC MARKERS ( 01 Jan 2023 22:57 )  x     / x     / 1785 U/L / x     / x            RADIOLOGY & ADDITIONAL TESTS: Reviewed

## 2023-01-02 NOTE — ED PROVIDER NOTE - CLINICAL SUMMARY MEDICAL DECISION MAKING FREE TEXT BOX
Patient with past medical history of CVA, A. fib, non-insulin-dependent diabetes, hyperlipidemia, mild CHF, compliant with all medications and from home who presents with generalized weakness and fatigue, status post generalized weakness and lowering himself to the floor and essentially laid on the floor for several hours.  But arrives not altered, ambulatory, and with sister who notes that she was there the entire time and he did not pass out and essentially refused 911 care.  Patient arrives to me with some scant abrasion to upper back but otherwise ambulatory, feels mildly warm, will obtain rectal temp, afebrile do blood cultures full labs including troponin, coags, CPK level to rule out rhabdo, CT of the brain rule out bleed or new CVA, C-spine and maxillofacial has scant abrasion to forehead, and chest x-ray, and likely patient will require an admission, do a viral swab.  Patient is full code

## 2023-01-02 NOTE — PHYSICAL THERAPY INITIAL EVALUATION ADULT - GAIT DISTANCE, PT EVAL
20 ft x 2 in room secondary to isolation precautions, standing rest break x 60 seconds in bathroom to use toilet

## 2023-01-02 NOTE — CONSULT NOTE ADULT - ASSESSMENT
per Internal Medicine    72 y o Male PMHx CVA (R MCA in 2013), HTN, HLD, Afib (on eliquis), CAD s/p stent (2014), ?CHF (on lasix) who presents after a fall. Found to have sepsis 2/2 COVID along with rhabdomyolysis. Admitted to [facility] for further management.    Problem/Plan - 1:  ·  Problem: Sepsis due to COVID-19.   ·  Plan: Meeting 2/4 SIRS (Temp, HR) without lactic acidosis. Currently on room air. D-dimer 311, CRP 37.6. UA negative.   - starting 5 day course remdesivir  - can hold off on steroids at this time given no hypoxia  -  PRN robitussin for cough  -  PRN tylenol for fever  - Goal SpO2 > 93%.    Problem/Plan - 2:  ·  Problem: Rhabdomyolysis.   ·  Plan: CK uptrending from 1785 to 4285 s/p reported fall at home. Received 1L NS bolus in the ED.   - c/w LR @75cc/hr   - f/up repeat cardiac enzymes at 10am    #Troponinemia   Trop-T elevated 0.03 on admission. EKG notable for afib w/ unifocal PVCs. Denies chest pain. Says history of stent placement in 2014. Possibly elevated i/s/o rhabdo picture above  - if persistently elevated will consult cardiology.    Problem/Plan - 3:  ·  Problem: Fall at home.   ·  Plan: PT eval  - plan as above  - fall precautions.    Problem/Plan - 4:  ·  Problem: Diabetes.   ·  Plan: Takes metformin 500mg BID.   - f/up A1c  - mISS   - consistent carb diet.    Problem/Plan - 5:  ·  Problem: CAD (coronary artery disease).   ·  Plan: Reports history of stent placement in 2014 with Dr. Cifuentes at [facility]. No clear CHF history but patient is on coreg 25mg BID and lasix 40mg daily. BNP elevated 2634.  - ordered TTE limited (COVID status)  - does not appear volume overloaded on exam so will hold off on lasix 40mg daily for now. Also w/ rhabdo picture noted above.  - obtain collateral.    Problem/Plan - 6:  ·  Problem: Chronic atrial fibrillation.   ·  Plan: - c/w eliquis 5mg BID.    Problem/Plan - 7:  ·  Problem: Cerebrovascular accident (CVA).   ·  Plan: CVA in 2013 to the R MCA region. No gross focal deficits appreciated on exam aside from 4+/5 LUE strength testing. Reports to taking plavix 75mg daily since his stroke, not on aspirin 81mg  - discuss plavix continuation with PCP  - c/w statin therapeutic interchange.    Problem/Plan - 8:  ·  Problem: HTN (hypertension).   ·  Plan: -147/67-78. Reports to taking coreg (cannot recall dose) and lasix 40mg. Per med rec at pharmacy takes amlodipine 5-40 q24h, coreg 25mg BID, and lasix 40mg daily  - will hold for now i/s/o sepsis and normotensive pressures  - if becomes persistently hypertensive will resume coreg at half-home dose.    Problem/Plan - 9:  ·  Problem: Nutrition, metabolism, and development symptoms.   ·  Plan: F: LR @ 75cc/hr for rhabdo   E: replete K<4, Mg<2  N: Dash/TLC, consistent carb  VTE Prophylaxis: eliquis 5mg BID  GI: pantoprazole  D: RMF-COVID.  
72y Male with PMHx of CVA (R MCA in 2013 per CT scan but per pt did not have any weakness at the time), HTN, HLD, Afib (on eliquis), CAD s/p stent (2014), ?CHF (on lasix) who presented to [facility] after a fall and landing on his left side. Stroke team consulted for reported new left arm weakness.    -recommend MRI short stroke protocol  -pt states that he never had left sided weakness prior, but with old right MCA infarct there is a possibility of recrudescence of old stroke symptoms  -continue home eliquis and plavix for secondary stroke prevention    Discussed with stroke fellow Dr. Obrien to be discussed with Dr. Fonseca

## 2023-01-02 NOTE — PHYSICAL THERAPY INITIAL EVALUATION ADULT - MANUAL MUSCLE TESTING RESULTS, REHAB EVAL
R shoulder flexion 4/5, L shoulder flexion 4-/5. Required min A for trunk control to remain upright with MMT of LE: L knee extension 4/5, R knee extension 4-/5, R hip flexion 4/5, L hip flexion 4-/5.

## 2023-01-02 NOTE — PHYSICAL THERAPY INITIAL EVALUATION ADULT - ADDITIONAL COMMENTS
As per pt, PTA he was completely independent with all functional mobility, ADLs, and IADLs with no AD. Pt states he walks 1.5 miles per day. Pt has a walk in shower.

## 2023-01-02 NOTE — ED PROVIDER NOTE - MUSCULOSKELETAL, MLM
Spine appears normal, range of motion is not limited, no muscle or joint tenderness, Bilateral superficial round abrasions to both anterior patellas with surrounding erythema which was demarcated with surgical pen.  No regions of discrete streaking crepitus or laceration

## 2023-01-02 NOTE — PHYSICAL THERAPY INITIAL EVALUATION ADULT - PERTINENT HX OF CURRENT PROBLEM, REHAB EVAL
73 y/o Male PMHx CVA (R MCA in 2013) who presents after a fall. Patient lives with his sister who reports he is fully independent. Patient reports between 2-3pm 1/2/2023 when standing upright from his chair to use the bathroom, his torso and legs gave out and landed on his left side. Says he was down for about 4 hours and had difficulty getting up to call 911. He waited for his sister (who lives with him) to return to the apartment before bringing him to the hospital. He also denies loss of consciousness and had full recollection of events. No history of prior falls. Says he is typically ambulatory and walks 1.5 miles per day. Last hospitalization in 2013 for CVA.

## 2023-01-02 NOTE — H&P ADULT - NSICDXPASTMEDICALHX_GEN_ALL_CORE_FT
PAST MEDICAL HISTORY:  CAD (coronary artery disease)     CVA (cerebrovascular accident)     Diabetes     HLD (hyperlipidemia)     Hypertension

## 2023-01-02 NOTE — H&P ADULT - NSHPPHYSICALEXAM_GEN_ALL_CORE
Constitutional: NAD, resting comfortably in bed  Head: mild bruising at the glabella   Eyes: PERRL, EOMI, anicteric sclera  ENT: dry mucus membranes, intermittent dry cough  Neck: supple; no JVD   Respiratory: CTA B/L; no W/R/R  Cardiac: +S1/S2; irregularly irregular with HR 60s; no M/R/G  Gastrointestinal: soft, NT/ND; no rebound or guarding; +BSx4  Back: spine midline, no bony tenderness  Extremities: WWP, b/l nonpitting edema with chronic venous stasis changes, b/l knee and L lateral malleoli skin tear s/p falls  Musculoskeletal: NROM x4; no joint swelling, tenderness or erythema; 5/5 strength testing of RUE/LLE/RLE but 4/5 LUE  Vascular: 2+ radial,  DP/PT pulses B/L  Dermatologic: warm and dry, skin findings as noted in extremity section above  Neurologic: AAOx3; no focal deficits  Psychiatric: affect and characteristics of appearance, verbalizations, behaviors are appropriate Constitutional: NAD, resting comfortably in bed  Head: mild bruising at the glabella   Eyes: PERRL, EOMI, anicteric sclera  ENT: dry mucus membranes, intermittent dry cough  Neck: supple; no JVD   Respiratory: CTA B/L; no W/R/R  Cardiac: +S1/S2; irregularly irregular with HR 60s; no M/R/G  Gastrointestinal: soft, NT/ND; no rebound or guarding; +BSx4  Back: spine midline, no bony tenderness  Extremities: WWP, b/l nonpitting edema with chronic venous stasis changes, b/l knee and L lateral malleoli skin tear s/p falls  Musculoskeletal: NROM x4; no joint swelling, tenderness or erythema; 5/5 strength testing of RUE/LLE/RLE but 4/5 LUE  Vascular: 2+ radial,  DP/PT pulses B/L  Dermatologic: warm and dry, skin findings as noted in extremity section above  Neurologic: AAOx3; +hard of hearing  Psychiatric: affect and characteristics of appearance, verbalizations, behaviors are appropriate Constitutional: NAD, resting comfortably in bed  Head: mild bruising at the glabella   Eyes: PERRL, EOMI, anicteric sclera  ENT: dry mucus membranes, intermittent dry cough  Neck: supple; no JVD   Respiratory: CTA B/L; no W/R/R  Cardiac: +S1/S2; irregularly irregular with HR 60s; no M/R/G  Gastrointestinal: soft, NT/ND; no rebound or guarding; +BSx4  Back: spine midline, no bony tenderness  Extremities: WWP, b/l nonpitting edema with chronic venous stasis changes, b/l knee and L lateral malleoli skin tear s/p falls  Musculoskeletal: NROM x4; no joint swelling, tenderness or erythema; 5/5 strength testing of RUE/LLE/RLE but 4/5 LUE  Vascular: 2+ radial,  DP/PT pulses B/L  Dermatologic: warm and dry, skin findings as noted in extremity section above  Neurologic: AAOx3; +hard of hearing, +dysmetria  Psychiatric: affect and characteristics of appearance, verbalizations, behaviors are appropriate

## 2023-01-02 NOTE — H&P ADULT - HISTORY OF PRESENT ILLNESS
*INCOMPLETE* *INCOMPLETE*    73 y/o Male PMHx CVA (R MCA), HTN, HLD, CHF (carvedilol, metformin, statin, lasix) who presents after a fall. Patient lives with his sister who reports he is fully independent. Sister reports patient fell onto his buttock earlier yesterday and sat on the floor for "multiple hours" and had difficulty getting himself up. He was resting against home furniture and resisted calling 911. Per ED chart note, sister reports no changes in mental status and no loss of consciousness. Patient admits to feeling more weak and fatigued along with poor PO intake since yesterday morning. He himself denies any loss of conscioussness, room spinning sensation, chest pain, palpitations, cough. Says he is fully vaccinated for COVID. Last hospitalization in  for CVA.     ED Course  Vitals: 100.9F (rectal), HR 75-97, -147/67-78, 98% RA, RR 16  Labs: notable for 16.9% monocytes, K 3.4, AST 54, CRP 37.6, D-dimer 311, CK  1785 < 4285, trop-T 0.03, CKMB 7.5; UA ketones 15, SG >1.03, large blood but no RBCs; +COVID  Imagin) CTH - no intracranial hemorrhage or fracture, old R MCA territory infarct  2) CT cervical - no acute cervical spine fx or traumatic subluxation, mlutilevel cervical spondylosis w/ varying degrees of central canal and neural foraminal stenosis  3) CT maxillofacial - no acute maxillofacial or mandibular fx, L maxillary sinus and ethmoid air cell mucosal thickening  4) CT abd/pelvis w/o - cholelithiasis w/o evidence of acute cholecystitis, colonic diverticulosis, small umbilical hernia and b/l inguinal hernia containing only fat  EKG: afib HR 83, QTc 465 w/ unifocal PVCs  Management: 1L NS bolus, APAP 975mg PO x1, coreg 12.5mg PO x1    ED provider discussed with cards on-call Dr. Loaiza about trop-I elevation who believes elevation is not cardiac etiology 73 y/o Male PMHx CVA (R MCA), HTN, HLD, Afib (on eliquis), CAD s/p stent (), ?CHF (on lasix) who presents after a fall. Patient lives with his sister who reports he is fully independent. Patient reports between 2-3pm yesterday when standing upright from his chair to use the bathroom, his torso and legs gave out and landed on his left side. Says he was down for about 4 hours and had difficulty getting up to call 911. He waited for his sister (who lives with him) to return to the apartment before bringing him to the hospital. Prior to and during the fall, he denied having chest pain, palpitations, and room spinning sensation. He also denies loss of consciousness and had full recollection of events. No history of prior falls. Says he is typically ambulatory and walks 1.5 miles per day. Does not use devices to assist with ambulation. Of note, has been experiencing upper nasal congestion for the past 3 weeks. No recent travels and sick contacts aside from seeing his sister on a daily basis. Reports COVID booster vaccination 2 weeks ago, but unsure if it was for the recent variant. On further ROS, he also admits to poor PO intake since yesterday morning. Last hospitalization in  for CVA.     ED Course  Vitals: 100.9F (rectal), HR 75-97, -147/67-78, 98% RA, RR 16  Labs: notable for 16.9% monocytes, K 3.4, AST 54, CRP 37.6, D-dimer 311, CK  1785 < 4285, trop-T 0.03, CKMB 7.5; BNP 2634; UA ketones 15, SG >1.03, large blood but no RBCs; +COVID  Imagin) CTH - no intracranial hemorrhage or fracture, old R MCA territory infarct  2) CT cervical - no acute cervical spine fx or traumatic subluxation, mlutilevel cervical spondylosis w/ varying degrees of central canal and neural foraminal stenosis  3) CT maxillofacial - no acute maxillofacial or mandibular fx, L maxillary sinus and ethmoid air cell mucosal thickening  4) CT abd/pelvis w/o - cholelithiasis w/o evidence of acute cholecystitis, colonic diverticulosis, small umbilical hernia and b/l inguinal hernia containing only fat  EKG: afib HR 83, QTc 465 w/ unifocal PVCs  Management: 1L NS bolus, APAP 975mg PO x1, coreg 12.5mg PO x1    ED provider discussed with cards on-call Dr. Loaiza about trop-I elev ation who believes elevation is not cardiac etiology 73 y/o Male PMHx CVA (R MCA in 2013), HTN, HLD, Afib (on eliquis), CAD s/p stent (), ?CHF (on lasix) who presents after a fall. Patient lives with his sister who reports he is fully independent. Patient reports between 2-3pm yesterday when standing upright from his chair to use the bathroom, his torso and legs gave out and landed on his left side. Says he was down for about 4 hours and had difficulty getting up to call 911. He waited for his sister (who lives with him) to return to the apartment before bringing him to the hospital. Prior to and during the fall, he denied having chest pain, palpitations, and room spinning sensation. He also denies loss of consciousness and had full recollection of events. No history of prior falls. Says he is typically ambulatory and walks 1.5 miles per day. Does not use devices to assist with ambulation. Of note, has been experiencing upper nasal congestion for the past 3 weeks. No recent travels and sick contacts aside from seeing his sister on a daily basis. Reports COVID booster vaccination 2 weeks ago, but unsure if it was for the recent variant. On further ROS, he also admits to poor PO intake since yesterday morning. Last hospitalization in  for CVA.     ED Course  Vitals: 100.9F (rectal), HR 75-97, -147/67-78, 98% RA, RR 16  Labs: notable for 16.9% monocytes, K 3.4, AST 54, CRP 37.6, D-dimer 311, CK  1785 < 4285, trop-T 0.03, CKMB 7.5; BNP 2634; UA ketones 15, SG >1.03, large blood but no RBCs; +COVID  Imagin) CTH - no intracranial hemorrhage or fracture, old R MCA territory infarct  2) CT cervical - no acute cervical spine fx or traumatic subluxation, mlutilevel cervical spondylosis w/ varying degrees of central canal and neural foraminal stenosis  3) CT maxillofacial - no acute maxillofacial or mandibular fx, L maxillary sinus and ethmoid air cell mucosal thickening  4) CT abd/pelvis w/o - cholelithiasis w/o evidence of acute cholecystitis, colonic diverticulosis, small umbilical hernia and b/l inguinal hernia containing only fat  EKG: afib HR 83, QTc 465 w/ unifocal PVCs  Management: 1L NS bolus, APAP 975mg PO x1, coreg 12.5mg PO x1    ED provider discussed with cards on-call Dr. Loaiza about trop-I elev ation who believes elevation is not cardiac etiology

## 2023-01-02 NOTE — H&P ADULT - PROBLEM SELECTOR PLAN 1
Meeting 2/4 SIRS (Temp, HR) without lactic acidosis. Currently on room air. D-dimer 311, CRP 37.6.  - starting 5 day course remdesivir  - can hold off on steroids at this time given no hypoxia Meeting 2/4 SIRS (Temp, HR) without lactic acidosis. Currently on room air. D-dimer 311, CRP 37.6. UA negative.   - starting 5 day course remdesivir  - can hold off on steroids at this time given no hypoxia  -  PRN robitussin for cough  -  PRN tylenol for fever  - Goal SpO2 > 93% Meeting 2/4 SIRS (Temp, HR) without lactic acidosis. Currently on room air. D-dimer 311, CRP 37.6. UA negative.   - will hold off on remdesivir and steroids at this time given no hypoxia  -  PRN robitussin for cough  -  PRN tylenol for fever  - Goal SpO2 > 93%

## 2023-01-03 LAB
ALBUMIN SERPL ELPH-MCNC: 3.4 G/DL — SIGNIFICANT CHANGE UP (ref 3.3–5)
ALP SERPL-CCNC: 58 U/L — SIGNIFICANT CHANGE UP (ref 40–120)
ALT FLD-CCNC: 18 U/L — SIGNIFICANT CHANGE UP (ref 10–45)
ANION GAP SERPL CALC-SCNC: 12 MMOL/L — SIGNIFICANT CHANGE UP (ref 5–17)
AST SERPL-CCNC: 73 U/L — HIGH (ref 10–40)
BASOPHILS # BLD AUTO: 0.04 K/UL — SIGNIFICANT CHANGE UP (ref 0–0.2)
BASOPHILS NFR BLD AUTO: 0.7 % — SIGNIFICANT CHANGE UP (ref 0–2)
BILIRUB SERPL-MCNC: 0.6 MG/DL — SIGNIFICANT CHANGE UP (ref 0.2–1.2)
BUN SERPL-MCNC: 16 MG/DL — SIGNIFICANT CHANGE UP (ref 7–23)
CALCIUM SERPL-MCNC: 8.9 MG/DL — SIGNIFICANT CHANGE UP (ref 8.4–10.5)
CHLORIDE SERPL-SCNC: 108 MMOL/L — SIGNIFICANT CHANGE UP (ref 96–108)
CK MB CFR SERPL CALC: 3.6 NG/ML — SIGNIFICANT CHANGE UP (ref 0–6.7)
CK SERPL-CCNC: 3125 U/L — HIGH (ref 30–200)
CO2 SERPL-SCNC: 21 MMOL/L — LOW (ref 22–31)
CREAT SERPL-MCNC: 0.97 MG/DL — SIGNIFICANT CHANGE UP (ref 0.5–1.3)
EGFR: 83 ML/MIN/1.73M2 — SIGNIFICANT CHANGE UP
EOSINOPHIL # BLD AUTO: 0.06 K/UL — SIGNIFICANT CHANGE UP (ref 0–0.5)
EOSINOPHIL NFR BLD AUTO: 1.1 % — SIGNIFICANT CHANGE UP (ref 0–6)
GLUCOSE SERPL-MCNC: 91 MG/DL — SIGNIFICANT CHANGE UP (ref 70–99)
HCT VFR BLD CALC: 36.8 % — LOW (ref 39–50)
HCV AB S/CO SERPL IA: 0.04 S/CO — SIGNIFICANT CHANGE UP
HCV AB SERPL-IMP: SIGNIFICANT CHANGE UP
HGB BLD-MCNC: 12.2 G/DL — LOW (ref 13–17)
IMM GRANULOCYTES NFR BLD AUTO: 0.4 % — SIGNIFICANT CHANGE UP (ref 0–0.9)
LYMPHOCYTES # BLD AUTO: 1.02 K/UL — SIGNIFICANT CHANGE UP (ref 1–3.3)
LYMPHOCYTES # BLD AUTO: 19 % — SIGNIFICANT CHANGE UP (ref 13–44)
MAGNESIUM SERPL-MCNC: 1.8 MG/DL — SIGNIFICANT CHANGE UP (ref 1.6–2.6)
MCHC RBC-ENTMCNC: 29 PG — SIGNIFICANT CHANGE UP (ref 27–34)
MCHC RBC-ENTMCNC: 33.2 GM/DL — SIGNIFICANT CHANGE UP (ref 32–36)
MCV RBC AUTO: 87.6 FL — SIGNIFICANT CHANGE UP (ref 80–100)
MONOCYTES # BLD AUTO: 0.77 K/UL — SIGNIFICANT CHANGE UP (ref 0–0.9)
MONOCYTES NFR BLD AUTO: 14.3 % — HIGH (ref 2–14)
NEUTROPHILS # BLD AUTO: 3.46 K/UL — SIGNIFICANT CHANGE UP (ref 1.8–7.4)
NEUTROPHILS NFR BLD AUTO: 64.5 % — SIGNIFICANT CHANGE UP (ref 43–77)
NRBC # BLD: 0 /100 WBCS — SIGNIFICANT CHANGE UP (ref 0–0)
PHOSPHATE SERPL-MCNC: 2.9 MG/DL — SIGNIFICANT CHANGE UP (ref 2.5–4.5)
PLATELET # BLD AUTO: 147 K/UL — LOW (ref 150–400)
POTASSIUM SERPL-MCNC: 3.7 MMOL/L — SIGNIFICANT CHANGE UP (ref 3.5–5.3)
POTASSIUM SERPL-SCNC: 3.7 MMOL/L — SIGNIFICANT CHANGE UP (ref 3.5–5.3)
PROT SERPL-MCNC: 6.5 G/DL — SIGNIFICANT CHANGE UP (ref 6–8.3)
RBC # BLD: 4.2 M/UL — SIGNIFICANT CHANGE UP (ref 4.2–5.8)
RBC # FLD: 13.6 % — SIGNIFICANT CHANGE UP (ref 10.3–14.5)
SODIUM SERPL-SCNC: 141 MMOL/L — SIGNIFICANT CHANGE UP (ref 135–145)
WBC # BLD: 5.37 K/UL — SIGNIFICANT CHANGE UP (ref 3.8–10.5)
WBC # FLD AUTO: 5.37 K/UL — SIGNIFICANT CHANGE UP (ref 3.8–10.5)

## 2023-01-03 PROCEDURE — 93308 TTE F-UP OR LMTD: CPT | Mod: 26

## 2023-01-03 PROCEDURE — 99233 SBSQ HOSP IP/OBS HIGH 50: CPT | Mod: GC

## 2023-01-03 RX ORDER — SODIUM CHLORIDE 9 MG/ML
1000 INJECTION, SOLUTION INTRAVENOUS
Refills: 0 | Status: DISCONTINUED | OUTPATIENT
Start: 2023-01-03 | End: 2023-01-03

## 2023-01-03 RX ORDER — CARVEDILOL PHOSPHATE 80 MG/1
25 CAPSULE, EXTENDED RELEASE ORAL EVERY 12 HOURS
Refills: 0 | Status: DISCONTINUED | OUTPATIENT
Start: 2023-01-03 | End: 2023-01-05

## 2023-01-03 RX ORDER — SODIUM CHLORIDE 9 MG/ML
1000 INJECTION, SOLUTION INTRAVENOUS
Refills: 0 | Status: DISCONTINUED | OUTPATIENT
Start: 2023-01-03 | End: 2023-01-05

## 2023-01-03 RX ADMIN — SODIUM CHLORIDE 100 MILLILITER(S): 9 INJECTION, SOLUTION INTRAVENOUS at 17:26

## 2023-01-03 RX ADMIN — SODIUM CHLORIDE 125 MILLILITER(S): 9 INJECTION, SOLUTION INTRAVENOUS at 01:01

## 2023-01-03 RX ADMIN — SODIUM CHLORIDE 100 MILLILITER(S): 9 INJECTION, SOLUTION INTRAVENOUS at 12:07

## 2023-01-03 RX ADMIN — CLOPIDOGREL BISULFATE 75 MILLIGRAM(S): 75 TABLET, FILM COATED ORAL at 12:08

## 2023-01-03 RX ADMIN — APIXABAN 5 MILLIGRAM(S): 2.5 TABLET, FILM COATED ORAL at 19:07

## 2023-01-03 RX ADMIN — CARVEDILOL PHOSPHATE 25 MILLIGRAM(S): 80 CAPSULE, EXTENDED RELEASE ORAL at 12:43

## 2023-01-03 RX ADMIN — APIXABAN 5 MILLIGRAM(S): 2.5 TABLET, FILM COATED ORAL at 06:12

## 2023-01-03 NOTE — PROGRESS NOTE ADULT - PROBLEM SELECTOR PLAN 8
-147/67-78. Reports to taking coreg 25mg BID and lasix 40mg. Per med rec at pharmacy takes amlodipine 5- benazepril 40 q24h, coreg 25mg BID, and lasix 40mg daily  - will hold for now i/s/o sepsis and normotensive pressures  - if becomes persistently hypertensive will resume coreg at half-home dose -147/67-78. Reports to taking coreg 25mg BID and lasix 40mg. Per med rec at pharmacy takes amlodipine 5- benazepril 40 q24h, coreg 25mg BID, and lasix 40mg daily    - restarted home coreg 25mg BID  - holding amlodipine5-benazpril 40 q24h, lasix 40mg qd i/s/o normotension and sepsis

## 2023-01-03 NOTE — PROGRESS NOTE ADULT - SUBJECTIVE AND OBJECTIVE BOX
** INCOMPLETE **    OVERNIGHT EVENTS:     SUBJECTIVE:    VITAL SIGNS:  Vital Signs Last 24 Hrs  T(C): 36.8 (03 Jan 2023 05:49), Max: 37 (02 Jan 2023 13:00)  T(F): 98.2 (03 Jan 2023 05:49), Max: 98.6 (02 Jan 2023 13:00)  HR: 84 (03 Jan 2023 05:49) (72 - 84)  BP: 154/87 (03 Jan 2023 05:49) (110/64 - 154/87)  BP(mean): --  RR: 19 (03 Jan 2023 05:49) (18 - 19)  SpO2: 92% (03 Jan 2023 05:49) (92% - 98%)    Parameters below as of 03 Jan 2023 05:49  Patient On (Oxygen Delivery Method): room air        PHYSICAL EXAM:  General: NAD; speaking in full sentences  HEENT: NC/AT; PERRL; EOMI; MMM  Neck: supple; no JVD  Cardiac: RRR; +S1/S2  Pulm: CTA B/L; no W/R/R  GI: soft, NT/ND, +BS  Extremities:  no edema, clubbing or cyanosis  Vasc: 2+ radial, DP pulses B/L  Neuro: AAOx3; no focal deficits    MEDICATIONS:  MEDICATIONS  (STANDING):  apixaban 5 milliGRAM(s) Oral every 12 hours  atorvastatin 10 milliGRAM(s) Oral at bedtime  clopidogrel Tablet 75 milliGRAM(s) Oral daily  dextrose 5%. 1000 milliLiter(s) (50 mL/Hr) IV Continuous <Continuous>  dextrose 5%. 1000 milliLiter(s) (100 mL/Hr) IV Continuous <Continuous>  dextrose 50% Injectable 25 Gram(s) IV Push once  dextrose 50% Injectable 12.5 Gram(s) IV Push once  dextrose 50% Injectable 25 Gram(s) IV Push once  glucagon  Injectable 1 milliGRAM(s) IntraMuscular once  influenza  Vaccine (HIGH DOSE) 0.7 milliLiter(s) IntraMuscular once  lactated ringers. 1000 milliLiter(s) (125 mL/Hr) IV Continuous <Continuous>  lactated ringers. 1000 milliLiter(s) (125 mL/Hr) IV Continuous <Continuous>    MEDICATIONS  (PRN):  acetaminophen     Tablet .. 650 milliGRAM(s) Oral every 6 hours PRN Temp greater or equal to 38C (100.4F), Mild Pain (1 - 3)  dextrose Oral Gel 15 Gram(s) Oral once PRN Blood Glucose LESS THAN 70 milliGRAM(s)/deciliter      ALLERGIES:  Allergies    No Known Allergies    Intolerances        LABS:                        13.3   6.46  )-----------( 163      ( 02 Jan 2023 05:09 )             40.0     01-02    138  |  103  |  21  ----------------------------<  99  3.4<L>   |  22  |  1.04    Ca    8.9      02 Jan 2023 05:09  Phos  3.0     01-02  Mg     2.0     01-02    TPro  7.1  /  Alb  3.9  /  TBili  0.8  /  DBili  x   /  AST  54<H>  /  ALT  12  /  AlkPhos  65  01-02    PT/INR - ( 01 Jan 2023 21:37 )   PT: 16.7 sec;   INR: 1.43          PTT - ( 01 Jan 2023 21:37 )  PTT:49.9 sec    RADIOLOGY & ADDITIONAL TESTS: Reviewed. OVERNIGHT EVENTS:   No acute events overnight.    SUBJECTIVE:  Pt seen and examined at bedside. Pt reports mild soreness to b/l LEs (calves, thighs) from when he fell but otherwise feels okay. No CP, dyspnea, lightheadedness, dizziness, abd pain.     VITAL SIGNS:  Vital Signs Last 24 Hrs  T(C): 36.8 (03 Jan 2023 05:49), Max: 37 (02 Jan 2023 13:00)  T(F): 98.2 (03 Jan 2023 05:49), Max: 98.6 (02 Jan 2023 13:00)  HR: 84 (03 Jan 2023 05:49) (72 - 84)  BP: 154/87 (03 Jan 2023 05:49) (110/64 - 154/87)  BP(mean): --  RR: 19 (03 Jan 2023 05:49) (18 - 19)  SpO2: 92% (03 Jan 2023 05:49) (92% - 98%)    Parameters below as of 03 Jan 2023 05:49  Patient On (Oxygen Delivery Method): room air        PHYSICAL EXAM:  General: NAD; speaking in full sentences  HEENT: NC/AT; PERRL; EOMI; MMM  Neck: supple; no JVD  Cardiac: RRR; +S1/S2  Pulm: CTA B/L; no W/R/R  GI: soft, NT/ND, +BS  Extremities:  no edema, clubbing or cyanosis  Vasc: 2+ radial, DP pulses B/L  Neuro: AAOx3; no focal deficits    MEDICATIONS:  MEDICATIONS  (STANDING):  apixaban 5 milliGRAM(s) Oral every 12 hours  atorvastatin 10 milliGRAM(s) Oral at bedtime  clopidogrel Tablet 75 milliGRAM(s) Oral daily  dextrose 5%. 1000 milliLiter(s) (50 mL/Hr) IV Continuous <Continuous>  dextrose 5%. 1000 milliLiter(s) (100 mL/Hr) IV Continuous <Continuous>  dextrose 50% Injectable 25 Gram(s) IV Push once  dextrose 50% Injectable 12.5 Gram(s) IV Push once  dextrose 50% Injectable 25 Gram(s) IV Push once  glucagon  Injectable 1 milliGRAM(s) IntraMuscular once  influenza  Vaccine (HIGH DOSE) 0.7 milliLiter(s) IntraMuscular once  lactated ringers. 1000 milliLiter(s) (125 mL/Hr) IV Continuous <Continuous>  lactated ringers. 1000 milliLiter(s) (125 mL/Hr) IV Continuous <Continuous>    MEDICATIONS  (PRN):  acetaminophen     Tablet .. 650 milliGRAM(s) Oral every 6 hours PRN Temp greater or equal to 38C (100.4F), Mild Pain (1 - 3)  dextrose Oral Gel 15 Gram(s) Oral once PRN Blood Glucose LESS THAN 70 milliGRAM(s)/deciliter      ALLERGIES:  Allergies    No Known Allergies    Intolerances        LABS:                        13.3   6.46  )-----------( 163      ( 02 Jan 2023 05:09 )             40.0     01-02    138  |  103  |  21  ----------------------------<  99  3.4<L>   |  22  |  1.04    Ca    8.9      02 Jan 2023 05:09  Phos  3.0     01-02  Mg     2.0     01-02    TPro  7.1  /  Alb  3.9  /  TBili  0.8  /  DBili  x   /  AST  54<H>  /  ALT  12  /  AlkPhos  65  01-02    PT/INR - ( 01 Jan 2023 21:37 )   PT: 16.7 sec;   INR: 1.43          PTT - ( 01 Jan 2023 21:37 )  PTT:49.9 sec    RADIOLOGY & ADDITIONAL TESTS: Reviewed.

## 2023-01-03 NOTE — PROGRESS NOTE ADULT - PROBLEM SELECTOR PLAN 5
Reports history of stent placement in 2014 with Dr. Cifuentes at [facility]. No clear CHF history but patient is on coreg 25mg BID and lasix 40mg daily. BNP elevated 2634.  - ordered TTE limited (COVID status)  - does not appear volume overloaded on exam so will hold off on lasix 40mg daily for now. Also w/ rhabdo picture noted above.  - obtain collateral Reports history of stent placement in 2014 with Dr. Cifuentes at [facility]. No clear CHF history but patient is on coreg 25mg BID and lasix 40mg daily. BNP elevated 2634.    - f/u TTE   - does not appear volume overloaded on exam so will hold off on lasix 40mg daily for now. Also w/ rhabdo picture noted above.  - obtain collateral

## 2023-01-03 NOTE — PROGRESS NOTE ADULT - PROBLEM SELECTOR PLAN 3
Fell on his left side w/ exam notable for dysmetria. CT head negative +chronic R MCA stroke but uncertain about possible new ischemia.   - stroke team recs appreciated, currently outside of window for tPA  - ordered MR brain w/o (short stroke protocol)  - plan as above  - fall precautions Fell on his left side w/ exam notable for dysmetria. CT head negative +chronic R MCA stroke but uncertain about possible new ischemia.     - stroke team recs appreciated, currently outside of window for tPA  - MR brain with no acute ischemia. Old right insular infarct and/or hemorrhage.  - plan as above  - fall precautions  - PT rec'd outpt PT

## 2023-01-03 NOTE — PROGRESS NOTE ADULT - PROBLEM SELECTOR PLAN 9
F: LR @ 125cc/hr for rhabdo   E: replete K<4, Mg<2  N: Dash/TLC, consistent carb  VTE Prophylaxis: eliquis 5mg BID  GI: pantoprazole  D: RMF-COVID F: LR @ 100cc/hr for rhabdo   E: replete K<4, Mg<2  N: Dash/TLC, consistent carb  VTE Prophylaxis: eliquis 5mg BID  GI: pantoprazole  D: RMF-COVID

## 2023-01-03 NOTE — PROGRESS NOTE ADULT - PROBLEM SELECTOR PLAN 4
Takes metformin 500mg BID.   - f/up A1c  - mISS   - consistent carb diet Takes metformin 500mg BID.     - A1c 5.9  - Freq FSG  - mISS   - consistent carb diet

## 2023-01-03 NOTE — PROGRESS NOTE ADULT - PROBLEM SELECTOR PLAN 1
Meeting 2/4 SIRS (Temp, HR) without lactic acidosis. Currently on room air. D-dimer 311, CRP 37.6. UA negative.   - will hold off on remdesivir and steroids at this time given no hypoxia  -  PRN robitussin for cough  -  PRN tylenol for fever  - Goal SpO2 > 93% Meeting 2/4 SIRS (Temp, HR) without lactic acidosis. Currently on room air. D-dimer 311, CRP 37.6. UA negative.     - will hold off on remdesivir and steroids at this time given no hypoxia  -  PRN robitussin for cough  -  PRN tylenol for fever  - Goal SpO2 > 93%

## 2023-01-03 NOTE — PROGRESS NOTE ADULT - PROBLEM SELECTOR PLAN 7
CVA in 2013 to the R MCA region. No gross focal deficits appreciated on exam aside from 4+/5 LUE strength testing. Reports to taking plavix 75mg daily and pravastatin 40mg since his stroke, not on aspirin 81mg  - c/w plavix 75mg daily  - c/w statin therapeutic interchange  - will need PCP clarification on plavix given 9 years since last stroke. He is also not on PPI. CVA in 2013 to the R MCA region. No gross focal deficits appreciated on exam aside from 4+/5 LUE strength testing. Reports to taking plavix 75mg daily and pravastatin 40mg since his stroke, not on aspirin 81mg    - c/w plavix 75mg daily  - Holding statin i/s/o rhabdo; restart when improved  - will need PCP clarification on plavix given 9 years since last stroke. He is also not on PPI.

## 2023-01-03 NOTE — PROGRESS NOTE ADULT - PROBLEM SELECTOR PLAN 2
CK uptrending from 1785 to 4285 s/p reported fall at home. Received 1L NS bolus in the ED.   - c/w LR @125cc/hr   - f/up repeat cardiac enzymes at 10am  - frequent lung checks given ?CHF history     #Troponinemia   Trop-T elevated 0.03 on admission. EKG notable for afib w/ unifocal PVCs. Denies chest pain. Says history of stent placement in 2014. Possibly elevated i/s/o rhabdo picture above  - if persistently elevated will consult cardiology CK uptrending from 1785 to 4285 s/p reported fall at home. Received 1L NS bolus in the ED.     - CK peaked at 5194 on 1/2 and now downtrending; CK 3125 today  - c/w LR @100cc/hr   - frequent lung checks given ?CHF history     #Troponinemia   Trop-T elevated 0.03 on admission. EKG notable for afib w/ unifocal PVCs. Denies chest pain. Says history of stent placement in 2014. Possibly elevated i/s/o rhabdo picture above  - trops downtrending, most recent trops 0.02

## 2023-01-04 LAB — GLUCOSE BLDC GLUCOMTR-MCNC: 85 MG/DL — SIGNIFICANT CHANGE UP (ref 70–99)

## 2023-01-04 PROCEDURE — 99232 SBSQ HOSP IP/OBS MODERATE 35: CPT | Mod: GC

## 2023-01-04 RX ADMIN — APIXABAN 5 MILLIGRAM(S): 2.5 TABLET, FILM COATED ORAL at 17:46

## 2023-01-04 RX ADMIN — CARVEDILOL PHOSPHATE 25 MILLIGRAM(S): 80 CAPSULE, EXTENDED RELEASE ORAL at 22:15

## 2023-01-04 RX ADMIN — CARVEDILOL PHOSPHATE 25 MILLIGRAM(S): 80 CAPSULE, EXTENDED RELEASE ORAL at 11:45

## 2023-01-04 RX ADMIN — CLOPIDOGREL BISULFATE 75 MILLIGRAM(S): 75 TABLET, FILM COATED ORAL at 11:45

## 2023-01-04 NOTE — PROGRESS NOTE ADULT - ASSESSMENT
71 y/o Male PMHx CVA (R MCA in 2013), HTN, HLD, Afib (on eliquis), CAD s/p stent (2014), ?CHF (on lasix) who presents after a fall. Found to have sepsis 2/2 COVID along with rhabdomyolysis after a fall. Admitted to [facility] for further management.

## 2023-01-04 NOTE — PROGRESS NOTE ADULT - ASSESSMENT
per Internal Medicine    72 y o Male PMHx CVA (R MCA in 2013), HTN, HLD, Afib (on eliquis), CAD s/p stent (2014), ?CHF (on lasix) who presents after a fall. Found to have sepsis 2/2 COVID along with rhabdomyolysis after a fall. Admitted to [facility] for further management.    Problem/Plan - 1:  ·  Problem: Sepsis due to COVID-19.   ·  Plan: Meeting 2/4 SIRS (Temp, HR) without lactic acidosis. Currently on room air. D-dimer 311, CRP 37.6. UA negative.     - will hold off on remdesivir and steroids at this time given no hypoxia  -  PRN robitussin for cough  -  PRN tylenol for fever  - Goal SpO2 > 93%.    Problem/Plan - 2:  ·  Problem: Rhabdomyolysis.   ·  Plan: CK uptrending from 1785 to 4285 s/p reported fall at home. Received 1L NS bolus in the ED.     - CK peaked at 5194 on 1/2 and now downtrending; most recent CK 3125   - c/w LR @100cc/hr   - frequent lung checks given ?CHF history     #Troponinemia   Trop-T elevated 0.03 on admission. EKG notable for afib w/ unifocal PVCs. Denies chest pain. Says history of stent placement in 2014. Possibly elevated i/s/o rhabdo picture above  - trops downtrending, most recent trops 0.02.    Problem/Plan - 3:  ·  Problem: Fall at home.   ·  Plan: Fell on his left side w/ exam notable for dysmetria. CT head negative +chronic R MCA stroke but uncertain about possible new ischemia.     - stroke team recs appreciated, currently outside of window for tPA  - MR brain with no acute ischemia. Old right insular infarct and/or hemorrhage.  - plan as above  - fall precautions  - PT rec'd outpt PT.    Problem/Plan - 4:  ·  Problem: Diabetes.   ·  Plan: Takes metformin 500mg BID.     - A1c 5.9  - Freq FSG  - mISS   - consistent carb diet.    Problem/Plan - 5:  ·  Problem: CAD (coronary artery disease).   ·  Plan: Reports history of stent placement in 2014 with Dr. Cifuentes at [facility]. No clear CHF history but patient is on coreg 25mg BID and lasix 40mg daily. BNP elevated 2634.    - Held Lasix iso rhabdo  - obtain collateral.    Problem/Plan - 6:  ·  Problem: Chronic atrial fibrillation.   ·  Plan: - c/w eliquis 5mg BID.    Problem/Plan - 7:  ·  Problem: Cerebrovascular accident (CVA).   ·  Plan: CVA in 2013 to the R MCA region. No gross focal deficits appreciated on exam aside from 4+/5 LUE strength testing. Reports to taking plavix 75mg daily and pravastatin 40mg since his stroke, not on aspirin 81mg    - c/w plavix 75mg daily  - Holding statin i/s/o rhabdo; restart when improved  - will need PCP clarification on plavix given 9 years since last stroke. He is also not on PPI.    Problem/Plan - 8:  ·  Problem: HTN (hypertension).   ·  Plan: -147/67-78. Reports to taking coreg 25mg BID and lasix 40mg. Per med rec at pharmacy takes amlodipine 5- benazepril 40 q24h, coreg 25mg BID, and lasix 40mg daily    - restarted home coreg 25mg BID  - holding amlodipine5-benazpril 40 q24h, lasix 40mg qd i/s/o normotension and sepsis.    Problem/Plan - 9:  ·  Problem: Nutrition, metabolism, and development symptoms.   ·  Plan: F: LR @ 100cc/hr for rhabdo   E: replete K<4, Mg<2  N: Dash/TLC, consistent carb  VTE Prophylaxis: eliquis 5mg BID  GI: pantoprazole

## 2023-01-04 NOTE — PROGRESS NOTE ADULT - PROBLEM SELECTOR PLAN 3
Fell on his left side w/ exam notable for dysmetria. CT head negative +chronic R MCA stroke but uncertain about possible new ischemia.     - stroke team recs appreciated, currently outside of window for tPA  - MR brain with no acute ischemia. Old right insular infarct and/or hemorrhage.  - plan as above  - fall precautions  - PT rec'd outpt PT

## 2023-01-04 NOTE — PROGRESS NOTE ADULT - PROBLEM SELECTOR PLAN 9
F: LR @ 100cc/hr for rhabdo   E: replete K<4, Mg<2  N: Dash/TLC, consistent carb  VTE Prophylaxis: eliquis 5mg BID  GI: pantoprazole  D: RMF-COVID F: LR @ 100cc/hr for rhabdo   E: replete K<4, Mg<2  N: Dash/TLC, consistent carb  VTE Prophylaxis: eliquis 5mg BID  GI: pantoprazole  D: RMF > home with outpt PT

## 2023-01-04 NOTE — DISCHARGE NOTE PROVIDER - NSDCFUADDAPPT_GEN_ALL_CORE_FT
Please bring your Insurance card, Photo ID and Discharge paperwork to the following appointment:    (1) Please follow up with your Primary Care Provider, Dr. Michael Lezama at [address] [place] [address] on 01/17/2023 at 9:15am.    Appointment was scheduled by Ms. FLOR Grewal, Referral Coordinator.

## 2023-01-04 NOTE — PROGRESS NOTE ADULT - PROBLEM SELECTOR PLAN 5
Reports history of stent placement in 2014 with Dr. Cifuentes at [facility]. No clear CHF history but patient is on coreg 25mg BID and lasix 40mg daily. BNP elevated 2634.    - f/u TTE   - does not appear volume overloaded on exam so will hold off on lasix 40mg daily for now. Also w/ rhabdo picture noted above.  - obtain collateral Reports history of stent placement in 2014 with Dr. Cifuentes at [facility]. No clear CHF history but patient is on coreg 25mg BID and lasix 40mg daily. BNP elevated 2634.    - Held Lasix iso rhabdo  - obtain collateral

## 2023-01-04 NOTE — DISCHARGE NOTE PROVIDER - HOSPITAL COURSE
#Discharge: do not delete    71 y/o Male PMHx CVA (R MCA in 2013), HTN, HLD, Afib (on eliquis), CAD s/p stent (2014), ?CHF (on lasix) who presents after a fall. Found to have sepsis 2/2 COVID along with rhabdomyolysis after a fall. Admitted to [facility] for further management.    Inpatient treatment course:   In the hospital, pt received IV fluids for management of rhabdomyolysis, and CK peaked on 1/2/23 and downtrended. Pt saturating well on RA; did not require remdesivir/decadron for COVID. Patient was medically optimized, stable and ready for discharge. Plan of care and return precautions were discussed with the patient who verbally stated understanding.     Problem List/Main Diagnoses:   1. Sepsis due to COVID-19  Meeting 2/4 SIRS (Temp, HR) without lactic acidosis. Currently on room air. D-dimer 311, CRP 37.6. UA negative.   - held off on remdesivir and steroids given no hypoxia  -  PRN robitussin for cough and  PRN tylenol for fever    2. Rhabdomyolysis.   CK uptrending from 1785 to 4285 s/p reported fall at home. Received 1L NS bolus in the ED.   - CK peaked at 5194 on 1/2 and now downtrending s/p IV fluids; most recent CK 3125     3. Troponinemia   Trop-T elevated 0.03 on admission. EKG notable for afib w/ unifocal PVCs. Denies chest pain. Says history of stent placement in 2014. Possibly elevated i/s/o rhabdo picture above  - trops downtrending, most recent trops 0.02.     4. Fall at home.   Fell on his left side w/ exam notable for dysmetria. CT head negative +chronic R MCA stroke but uncertain about possible new ischemia.   - stroke team recs appreciated  - MR brain with no acute ischemia. Old right insular infarct and/or hemorrhage.  - PT rec'd outpt PT.    5. Diabetes.   Takes metformin 500mg BID.   - A1c 5.9  - mISS     6. CAD (coronary artery disease).   Reports history of stent placement in 2014 with Dr. Cifuentes at [facility]. No clear CHF history but patient is on coreg 25mg BID and lasix 40mg daily. BNP elevated 2634.  - held Lasix i/s/o rhabdo; restart on 1/8    7. Chronic atrial fibrillation.   - c/w eliquis 5mg BID.     8. Cerebrovascular accident (CVA).   CVA in 2013 to the R MCA region. No gross focal deficits appreciated on exam aside from 4+/5 LUE strength testing. Reports to taking plavix 75mg daily and pravastatin 40mg since his stroke, not on aspirin 81mg  - c/w plavix 75mg daily  - Held atorvastatin i/s/o rhabdo; restart on 1/8    9. HTN (hypertension).   -147/67-78. Reports to taking coreg 25mg BID and lasix 40mg. Per med rec at pharmacy takes amlodipine 5- benazepril 40 q24h, coreg 25mg BID, and lasix 40mg daily  - c/w home coreg 25mg BID and amlodipine5-benazpril 40 q24h  - restart lasix 40mg qd on 1/8    Changes to medications/therapies: Restart home atorvastatin and Lasix on Sunday 1/8/23 (held in the setting of rhabdo)  Labs to be followed outpatient: CKMB  Exam to be followed outpatient: physical exam    Discharge plan: discharge to home with outpatient PT     Physical Exam on Discharge:  General: NAD; speaking in full sentences  HEENT: NC/AT; PERRL; EOMI; MMM  Neck: supple; no JVD  Cardiac: RRR; +S1/S2  Pulm: CTA B/L; no W/R/R  GI: soft, NT/ND, +BS  Extremities:  abrasions to b/l knees with chronic venous stasis changes to b/l LEs  Vasc: 2+ radial pulses B/L  Neuro: AAOx3; no focal deficits #Discharge: do not delete    24hour discharge notice was given on 1/4/2023 at 4pm.     71 y/o Male PMHx CVA (R MCA in 2013), HTN, HLD, Afib (on eliquis), CAD s/p stent (2014), ?CHF (on lasix) who presents after a fall. Found to have sepsis 2/2 COVID along with rhabdomyolysis after a fall. Admitted to [facility] for further management.    Inpatient treatment course:   In the hospital, pt received IV fluids for management of rhabdomyolysis, and CK peaked on 1/2/23 and downtrended. Pt saturating well on RA; did not require remdesivir/decadron for COVID. Patient was medically optimized, stable and ready for discharge. Plan of care and return precautions were discussed with the patient who verbally stated understanding.     Problem List/Main Diagnoses:   1. Sepsis due to COVID-19  Meeting 2/4 SIRS (Temp, HR) without lactic acidosis. Currently on room air. D-dimer 311, CRP 37.6. UA negative.   - held off on remdesivir and steroids given no hypoxia  -  PRN robitussin for cough and  PRN tylenol for fever    2. Rhabdomyolysis.   CK uptrending from 1785 to 4285 s/p reported fall at home. Received 1L NS bolus in the ED.   - CK peaked at 5194 on 1/2 and now downtrending s/p IV fluids; most recent CK 3125     3. Troponinemia   Trop-T elevated 0.03 on admission. EKG notable for afib w/ unifocal PVCs. Denies chest pain. Says history of stent placement in 2014. Possibly elevated i/s/o rhabdo picture above  - trops downtrending, most recent trops 0.02.     4. Fall at home.   Fell on his left side w/ exam notable for dysmetria. CT head negative +chronic R MCA stroke but uncertain about possible new ischemia.   - stroke team recs appreciated  - MR brain with no acute ischemia. Old right insular infarct and/or hemorrhage.  - PT rec'd outpt PT.    5. Diabetes.   Takes metformin 500mg BID.   - A1c 5.9  - mISS     6. CAD (coronary artery disease).   Reports history of stent placement in 2014 with Dr. Cifuentes at [facility]. No clear CHF history but patient is on coreg 25mg BID and lasix 40mg daily. BNP elevated 2634.  - held Lasix i/s/o rhabdo; restart on 1/8    7. Chronic atrial fibrillation.   - c/w eliquis 5mg BID.     8. Cerebrovascular accident (CVA).   CVA in 2013 to the R MCA region. No gross focal deficits appreciated on exam aside from 4+/5 LUE strength testing. Reports to taking plavix 75mg daily and pravastatin 40mg since his stroke, not on aspirin 81mg  - c/w plavix 75mg daily  - Held atorvastatin i/s/o rhabdo; restart on 1/8    9. HTN (hypertension).   -147/67-78. Reports to taking coreg 25mg BID and lasix 40mg. Per med rec at pharmacy takes amlodipine 5- benazepril 40 q24h, coreg 25mg BID, and lasix 40mg daily  - c/w home coreg 25mg BID and amlodipine5-benazpril 40 q24h  - restart lasix 40mg qd on 1/8    Changes to medications/therapies: Restart home atorvastatin and Lasix on Sunday 1/8/23 (held in the setting of rhabdo)  Labs to be followed outpatient: CKMB  Exam to be followed outpatient: physical exam    Discharge plan: discharge to home with outpatient PT     Physical Exam on Discharge:  General: NAD; speaking in full sentences  HEENT: NC/AT; PERRL; EOMI; MMM  Neck: supple; no JVD  Cardiac: RRR; +S1/S2  Pulm: CTA B/L; no W/R/R  GI: soft, NT/ND, +BS  Extremities:  abrasions to b/l knees with chronic venous stasis changes to b/l LEs  Vasc: 2+ radial pulses B/L  Neuro: AAOx3; no focal deficits

## 2023-01-04 NOTE — DISCHARGE NOTE PROVIDER - CARE PROVIDER_API CALL
Michael Lezama  [facility]  [address] [place] [address]  Phone: [phone]  Fax: [phone]  Established Patient  Follow Up Time: 1 week   Michael Lezama  [facility]  [address] [place] [address]  Phone: [phone]  Fax: [phone]  Established Patient  Scheduled Appointment: 01/17/2023 09:15 AM

## 2023-01-04 NOTE — PROGRESS NOTE ADULT - SUBJECTIVE AND OBJECTIVE BOX
** INCOMPLETE **    OVERNIGHT EVENTS:     SUBJECTIVE:    VITAL SIGNS:  Vital Signs Last 24 Hrs  T(C): 36.6 (03 Jan 2023 13:15), Max: 36.6 (03 Jan 2023 13:15)  T(F): 97.9 (03 Jan 2023 13:15), Max: 97.9 (03 Jan 2023 13:15)  HR: 69 (03 Jan 2023 13:15) (69 - 69)  BP: 142/79 (03 Jan 2023 13:15) (142/79 - 142/79)  BP(mean): --  RR: 19 (03 Jan 2023 13:15) (19 - 19)  SpO2: 96% (03 Jan 2023 13:15) (96% - 96%)    Parameters below as of 03 Jan 2023 13:15  Patient On (Oxygen Delivery Method): room air        PHYSICAL EXAM:  General: NAD; speaking in full sentences  HEENT: NC/AT; PERRL; EOMI; MMM  Neck: supple; no JVD  Cardiac: RRR; +S1/S2  Pulm: CTA B/L; no W/R/R  GI: soft, NT/ND, +BS  Extremities:  no edema, clubbing or cyanosis  Vasc: 2+ radial, DP pulses B/L  Neuro: AAOx3; no focal deficits    MEDICATIONS:  MEDICATIONS  (STANDING):  apixaban 5 milliGRAM(s) Oral every 12 hours  carvedilol 25 milliGRAM(s) Oral every 12 hours  clopidogrel Tablet 75 milliGRAM(s) Oral daily  dextrose 5%. 1000 milliLiter(s) (50 mL/Hr) IV Continuous <Continuous>  dextrose 5%. 1000 milliLiter(s) (100 mL/Hr) IV Continuous <Continuous>  dextrose 50% Injectable 25 Gram(s) IV Push once  dextrose 50% Injectable 12.5 Gram(s) IV Push once  dextrose 50% Injectable 25 Gram(s) IV Push once  glucagon  Injectable 1 milliGRAM(s) IntraMuscular once  influenza  Vaccine (HIGH DOSE) 0.7 milliLiter(s) IntraMuscular once  lactated ringers. 1000 milliLiter(s) (100 mL/Hr) IV Continuous <Continuous>    MEDICATIONS  (PRN):  acetaminophen     Tablet .. 650 milliGRAM(s) Oral every 6 hours PRN Temp greater or equal to 38C (100.4F), Mild Pain (1 - 3)  dextrose Oral Gel 15 Gram(s) Oral once PRN Blood Glucose LESS THAN 70 milliGRAM(s)/deciliter      ALLERGIES:  Allergies    No Known Allergies    Intolerances        LABS:                        12.2   5.37  )-----------( 147      ( 03 Jan 2023 07:57 )             36.8     01-03    141  |  108  |  16  ----------------------------<  91  3.7   |  21<L>  |  0.97    Ca    8.9      03 Jan 2023 07:57  Phos  2.9     01-03  Mg     1.8     01-03    TPro  6.5  /  Alb  3.4  /  TBili  0.6  /  DBili  x   /  AST  73<H>  /  ALT  18  /  AlkPhos  58  01-03        RADIOLOGY & ADDITIONAL TESTS: Reviewed. OVERNIGHT EVENTS:   No acute events overnight.    SUBJECTIVE:  Pt seen and examined at bedside. Pt is frustrated because he does not enjoy the temperature of the food. Refusing all vitals and labs. Otherwise, states b/l LE extremity soreness has improved from yesterday.    VITAL SIGNS:  Vital Signs Last 24 Hrs  T(C): 36.6 (03 Jan 2023 13:15), Max: 36.6 (03 Jan 2023 13:15)  T(F): 97.9 (03 Jan 2023 13:15), Max: 97.9 (03 Jan 2023 13:15)  HR: 69 (03 Jan 2023 13:15) (69 - 69)  BP: 142/79 (03 Jan 2023 13:15) (142/79 - 142/79)  BP(mean): --  RR: 19 (03 Jan 2023 13:15) (19 - 19)  SpO2: 96% (03 Jan 2023 13:15) (96% - 96%)    Parameters below as of 03 Jan 2023 13:15  Patient On (Oxygen Delivery Method): room air        PHYSICAL EXAM:  General: NAD; speaking in full sentences  HEENT: NC/AT; PERRL; EOMI; MMM  Neck: supple; no JVD  Cardiac: RRR; +S1/S2  Pulm: CTA B/L; no W/R/R  GI: soft, NT/ND, +BS  Extremities:  open abrasions to b/l knees; chronic venous stasis changes to b/l LEs  Vasc: 2+ radial pulses B/L  Neuro: AAOx3; no focal deficits    MEDICATIONS:  MEDICATIONS  (STANDING):  apixaban 5 milliGRAM(s) Oral every 12 hours  carvedilol 25 milliGRAM(s) Oral every 12 hours  clopidogrel Tablet 75 milliGRAM(s) Oral daily  dextrose 5%. 1000 milliLiter(s) (50 mL/Hr) IV Continuous <Continuous>  dextrose 5%. 1000 milliLiter(s) (100 mL/Hr) IV Continuous <Continuous>  dextrose 50% Injectable 25 Gram(s) IV Push once  dextrose 50% Injectable 12.5 Gram(s) IV Push once  dextrose 50% Injectable 25 Gram(s) IV Push once  glucagon  Injectable 1 milliGRAM(s) IntraMuscular once  influenza  Vaccine (HIGH DOSE) 0.7 milliLiter(s) IntraMuscular once  lactated ringers. 1000 milliLiter(s) (100 mL/Hr) IV Continuous <Continuous>    MEDICATIONS  (PRN):  acetaminophen     Tablet .. 650 milliGRAM(s) Oral every 6 hours PRN Temp greater or equal to 38C (100.4F), Mild Pain (1 - 3)  dextrose Oral Gel 15 Gram(s) Oral once PRN Blood Glucose LESS THAN 70 milliGRAM(s)/deciliter      ALLERGIES:  Allergies    No Known Allergies    Intolerances        LABS:                        12.2   5.37  )-----------( 147      ( 03 Jan 2023 07:57 )             36.8     01-03    141  |  108  |  16  ----------------------------<  91  3.7   |  21<L>  |  0.97    Ca    8.9      03 Jan 2023 07:57  Phos  2.9     01-03  Mg     1.8     01-03    TPro  6.5  /  Alb  3.4  /  TBili  0.6  /  DBili  x   /  AST  73<H>  /  ALT  18  /  AlkPhos  58  01-03        RADIOLOGY & ADDITIONAL TESTS: Reviewed.

## 2023-01-04 NOTE — PROGRESS NOTE ADULT - PROBLEM SELECTOR PLAN 8
-147/67-78. Reports to taking coreg 25mg BID and lasix 40mg. Per med rec at pharmacy takes amlodipine 5- benazepril 40 q24h, coreg 25mg BID, and lasix 40mg daily    - restarted home coreg 25mg BID  - holding amlodipine5-benazpril 40 q24h, lasix 40mg qd i/s/o normotension and sepsis

## 2023-01-04 NOTE — PROGRESS NOTE ADULT - ATTENDING COMMENTS
cpk improving   patient on room air   muscle soreness resolved   not orthostatic     ready for discharge home today
73 yo male admitted after a mechanical fall , he has covid but is asymptomatic    and is on room iar     Impression and Plan   # Rhabdomyolysis - IV fluids , hold statin , recheck CPK   # NIDDM   # CAD without angina   # Chronic Afib , not in RVR , on eliquis   # HTN  holding amlodipine, Benezapril - HCTZ , lasix for now     Dispo : Home with out patient PT in am

## 2023-01-04 NOTE — PROVIDER CONTACT NOTE (OTHER) - SITUATION
Provider made aware that patient refused vital signs and carvedilol.
provider made aware that patient refused vitals signs and wants to provider regarding meals provided at hospital.
Provider made aware that patient refused morning medications and vitals.

## 2023-01-04 NOTE — PROGRESS NOTE ADULT - SUBJECTIVE AND OBJECTIVE BOX
Physical Medicine and Rehabilitation Progress Note :       Patient is a 72y old  Male who presents with a chief complaint of COVID, Rhabdo, Fall (2023 11:55)      HPI:  71 y/o Male PMHx CVA (R MCA in ), HTN, HLD, Afib (on eliquis), CAD s/p stent (), ?CHF (on lasix) who presents after a fall. Patient lives with his sister who reports he is fully independent. Patient reports between 2-3pm yesterday when standing upright from his chair to use the bathroom, his torso and legs gave out and landed on his left side. Says he was down for about 4 hours and had difficulty getting up to call 911. He waited for his sister (who lives with him) to return to the apartment before bringing him to the hospital. Prior to and during the fall, he denied having chest pain, palpitations, and room spinning sensation. He also denies loss of consciousness and had full recollection of events. No history of prior falls. Says he is typically ambulatory and walks 1.5 miles per day. Does not use devices to assist with ambulation. Of note, has been experiencing upper nasal congestion for the past 3 weeks. No recent travels and sick contacts aside from seeing his sister on a daily basis. Reports COVID booster vaccination 2 weeks ago, but unsure if it was for the recent variant. On further ROS, he also admits to poor PO intake since yesterday morning. Last hospitalization in  for CVA.     ED Course  Vitals: 100.9F (rectal), HR 75-97, -147/67-78, 98% RA, RR 16  Labs: notable for 16.9% monocytes, K 3.4, AST 54, CRP 37.6, D-dimer 311, CK  1785 < 4285, trop-T 0.03, CKMB 7.5; BNP 2634; UA ketones 15, SG >1.03, large blood but no RBCs; +COVID  Imagin) CTH - no intracranial hemorrhage or fracture, old R MCA territory infarct  2) CT cervical - no acute cervical spine fx or traumatic subluxation, mlutilevel cervical spondylosis w/ varying degrees of central canal and neural foraminal stenosis  3) CT maxillofacial - no acute maxillofacial or mandibular fx, L maxillary sinus and ethmoid air cell mucosal thickening  4) CT abd/pelvis w/o - cholelithiasis w/o evidence of acute cholecystitis, colonic diverticulosis, small umbilical hernia and b/l inguinal hernia containing only fat  EKG: afib HR 83, QTc 465 w/ unifocal PVCs  Management: 1L NS bolus, APAP 975mg PO x1, coreg 12.5mg PO x1    ED provider discussed with cards on-call Dr. Loaiza about trop-I elev ation who believes elevation is not cardiac etiology (2023 07:15)                            12.2   5.37  )-----------( 147      ( 2023 07:57 )             36.8       01-03    141  |  108  |  16  ----------------------------<  91  3.7   |  21<L>  |  0.97    Ca    8.9      2023 07:57  Phos  2.9     01-03  Mg     1.8     01-03    TPro  6.5  /  Alb  3.4  /  TBili  0.6  /  DBili  x   /  AST  73<H>  /  ALT  18  /  AlkPhos  58  01-03    Vital Signs Last 24 Hrs  T(C): 36.6 (2023 13:15), Max: 36.6 (2023 13:15)  T(F): 97.9 (2023 13:15), Max: 97.9 (2023 13:15)  HR: 69 (2023 13:15) (69 - 69)  BP: 142/79 (2023 13:15) (142/79 - 142/79)  BP(mean): --  RR: 19 (2023 13:15) (19 - 19)  SpO2: 96% (2023 13:15) (96% - 96%)    Parameters below as of 2023 13:15  Patient On (Oxygen Delivery Method): room air        MEDICATIONS  (STANDING):  apixaban 5 milliGRAM(s) Oral every 12 hours  carvedilol 25 milliGRAM(s) Oral every 12 hours  clopidogrel Tablet 75 milliGRAM(s) Oral daily  dextrose 5%. 1000 milliLiter(s) (50 mL/Hr) IV Continuous <Continuous>  dextrose 5%. 1000 milliLiter(s) (100 mL/Hr) IV Continuous <Continuous>  dextrose 50% Injectable 25 Gram(s) IV Push once  dextrose 50% Injectable 12.5 Gram(s) IV Push once  dextrose 50% Injectable 25 Gram(s) IV Push once  glucagon  Injectable 1 milliGRAM(s) IntraMuscular once  influenza  Vaccine (HIGH DOSE) 0.7 milliLiter(s) IntraMuscular once  lactated ringers. 1000 milliLiter(s) (100 mL/Hr) IV Continuous <Continuous>    MEDICATIONS  (PRN):  acetaminophen     Tablet .. 650 milliGRAM(s) Oral every 6 hours PRN Temp greater or equal to 38C (100.4F), Mild Pain (1 - 3)  dextrose Oral Gel 15 Gram(s) Oral once PRN Blood Glucose LESS THAN 70 milliGRAM(s)/deciliter        Initial Physical Therapy Functional Status Assessment :       Previous Level of Function:     · Ambulation Skills	independent  · Transfer Skills	independent  · ADL Skills	independent  · Work/Leisure Activity	independent  · Additional Comments	As per pt, PTA he was completely independent with all functional mobility, ADLs, and IADLs with no AD. Pt states he walks 1.5 miles per day. Pt has a walk in shower.    Cognitive Status Examination:   · Orientation	oriented to person, place, time and situation  · Level of Consciousness	alert  · Follows Commands and Answers Questions	100% of the time  · Personal Safety and Judgment	intact    Range of Motion Exam:   · Range of Motion Examination	B/l LE WFL, B/l UE ~75% AROM    Manual Muscle Testing:   · Manual Muscle Testing Results	R shoulder flexion 4/5, L shoulder flexion 4-/5. Required min A for trunk control to remain upright with MMT of LE: L knee extension 4/5, R knee extension 4-/5, R hip flexion 4/5, L hip flexion 4-/5.    Bed Mobility: Sit to Supine:     · Level of [place]	independent  · Physical Assist/Nonphysical Assist	supervision; verbal cues    Bed Mobility: Supine to Sit:     · Level of [place]	independent  · Physical Assist/Nonphysical Assist	supervision; verbal cues    Transfer: Sit to Stand:     · Level of [place]	contact guard  · Physical Assist/Nonphysical Assist	1 person assist; verbal cues    Transfer: Stand to Sit:     · Level of [place]	contact guard  · Physical Assist/Nonphysical Assist	1 person assist; verbal cues    Sit/Stand Transfer Safety Analysis:     · Impairments Contributing to Impaired Transfers	decreased strength; impaired balance    Gait Skills:     · Level of [place]	contact guard  · Physical Assist/Nonphysical Assist	1 person assist; verbal cues  · Assistive Device	IV pole  · Gait Distance	20 ft x 2 in room secondary to isolation precautions, standing rest break x 60 seconds in bathroom to use toilet    Gait Analysis:     · Gait Deviations Noted	decreased adi; decreased velocity of limb motion; decreased step length; decreased weight-shifting ability  · Impairments Contributing to Gait Deviations	decreased strength; impaired balance    Balance Skills Assessment:     · Sitting Balance: Static	good balance  · Sitting Balance: Dynamic	fair balance  Augustus to remain upright with LE AROM in seated  · Sit-to-Stand Balance	fair balance  · Standing Balance: Static	fair balance  · Standing Balance: Dynamic	fair balance  · Identified Impairments Contributing to Balance Disturbance	decreased strength    Sensory Examination:   Sensory Examination:    Grossly Intact:   · Gross Sensory Examination	Grossly Intact      Clinical Impressions:   · Criteria for Skilled Therapeutic Interventions	impairments found; functional limitations in following categories  · Impairments Found (describe specific impairments)	aerobic capacity/endurance; muscle strength; arousal, attention, and cognition; gait, locomotion, and balance; gross motor  · Risk Reduction/Prevention (Describe Specific Areas of risk reduction/prevention)	risk factors  · Risk Areas	fall  · Rehab Potential	good, to achieve stated therapy goals        PM&R Impression : as above    Current Disposition Plan Recommendations :    d/c home, outpatient physical therapy

## 2023-01-04 NOTE — PROGRESS NOTE ADULT - PROBLEM SELECTOR PLAN 2
CK uptrending from 1785 to 4285 s/p reported fall at home. Received 1L NS bolus in the ED.     - CK peaked at 5194 on 1/2 and now downtrending; CK 3125 today  - c/w LR @100cc/hr   - frequent lung checks given ?CHF history     #Troponinemia   Trop-T elevated 0.03 on admission. EKG notable for afib w/ unifocal PVCs. Denies chest pain. Says history of stent placement in 2014. Possibly elevated i/s/o rhabdo picture above  - trops downtrending, most recent trops 0.02 CK uptrending from 1785 to 4285 s/p reported fall at home. Received 1L NS bolus in the ED.     - CK peaked at 5194 on 1/2 and now downtrending; most recent CK 3125   - c/w LR @100cc/hr   - frequent lung checks given ?CHF history     #Troponinemia   Trop-T elevated 0.03 on admission. EKG notable for afib w/ unifocal PVCs. Denies chest pain. Says history of stent placement in 2014. Possibly elevated i/s/o rhabdo picture above  - trops downtrending, most recent trops 0.02

## 2023-01-04 NOTE — PROGRESS NOTE ADULT - PROBLEM SELECTOR PLAN 1
Meeting 2/4 SIRS (Temp, HR) without lactic acidosis. Currently on room air. D-dimer 311, CRP 37.6. UA negative.     - will hold off on remdesivir and steroids at this time given no hypoxia  -  PRN robitussin for cough  -  PRN tylenol for fever  - Goal SpO2 > 93%

## 2023-01-04 NOTE — PROGRESS NOTE ADULT - PROBLEM SELECTOR PLAN 7
CVA in 2013 to the R MCA region. No gross focal deficits appreciated on exam aside from 4+/5 LUE strength testing. Reports to taking plavix 75mg daily and pravastatin 40mg since his stroke, not on aspirin 81mg    - c/w plavix 75mg daily  - Holding statin i/s/o rhabdo; restart when improved  - will need PCP clarification on plavix given 9 years since last stroke. He is also not on PPI.

## 2023-01-04 NOTE — DISCHARGE NOTE PROVIDER - NSDCCPCAREPLAN_GEN_ALL_CORE_FT
PRINCIPAL DISCHARGE DIAGNOSIS  Diagnosis: Rhabdomyolysis  Assessment and Plan of Treatment: Rhabdomyolysis is the breakdown of damaged muscle which results in the release of muscle cell contents into the blood. The proteins and electrolytes released into the blood can cause organ damage. Rhabdomyolysis can occur anytime muscle is damaged or killed. In the workplace, risk factors can include heat exposure, physical exertion or overuse, and direct trauma (e.g., crush injury from a fall).   ___________________________________  For management of your rhabdomyolysis, you received intravenous fluids, and we held 2 of your medications, atorvastatin and lasix. Please restart your home atorvastatin and lasix on Sunday 1/8/2023. Please follow up with your primary care doctor Dr. Lezama in 1 week for further management.      SECONDARY DISCHARGE DIAGNOSES  Diagnosis: Rhabdomyolysis  Assessment and Plan of Treatment:      PRINCIPAL DISCHARGE DIAGNOSIS  Diagnosis: Rhabdomyolysis  Assessment and Plan of Treatment: Rhabdomyolysis is the breakdown of damaged muscle which results in the release of muscle cell contents into the blood. The proteins and electrolytes released into the blood can cause organ damage. Rhabdomyolysis can occur anytime muscle is damaged or killed. In the workplace, risk factors can include heat exposure, physical exertion or overuse, and direct trauma (e.g., crush injury from a fall).   ___________________________________  For management of your rhabdomyolysis, you received intravenous fluids, and we held 2 of your medications, pravastatin and lasix. Please restart your home pravastatin and lasix on Sunday 1/8/2023. Please follow up with your primary care doctor Dr. Lezama in 1 week for further management.      SECONDARY DISCHARGE DIAGNOSES  Diagnosis: Rhabdomyolysis  Assessment and Plan of Treatment:      PRINCIPAL DISCHARGE DIAGNOSIS  Diagnosis: Rhabdomyolysis  Assessment and Plan of Treatment: Rhabdomyolysis is the breakdown of damaged muscle which results in the release of muscle cell contents into the blood. The proteins and electrolytes released into the blood can cause organ damage. Rhabdomyolysis can occur anytime muscle is damaged or killed. In the workplace, risk factors can include heat exposure, physical exertion or overuse, and direct trauma (e.g., crush injury from a fall).   ___________________________________  For management of your rhabdomyolysis, you received intravenous fluids, and we held 2 of your medications, pravastatin and lasix. Please restart your home pravastatin and lasix on Sunday 1/8/2023. Please follow up with your primary care doctor Dr. Lezama in 1-2 weeks for further management.  Please follow up with your Primary Care Provider, Dr. Michael Lezama at [address] [place] [address] on 01/17/2023 at 9:15am.      SECONDARY DISCHARGE DIAGNOSES  Diagnosis: Rhabdomyolysis  Assessment and Plan of Treatment:

## 2023-01-04 NOTE — DISCHARGE NOTE PROVIDER - PROVIDER TOKENS
PROVIDER:[TOKEN:[[id number]],FOLLOWUP:[1 week],ESTABLISHEDPATIENT:[T]] PROVIDER:[TOKEN:[[id number]],SCHEDULEDAPPT:[01/17/2023],SCHEDULEDAPPTTIME:[09:15 AM],ESTABLISHEDPATIENT:[T]]

## 2023-01-04 NOTE — DISCHARGE NOTE PROVIDER - NSDCMRMEDTOKEN_GEN_ALL_CORE_FT
amlodipine-benazepril 5 mg-40 mg oral capsule: 1 cap(s) orally once a day  Coreg 25 mg oral tablet: 1 tab(s) orally 2 times a day  Eliquis 5 mg oral tablet: 1 tab(s) orally 2 times a day  Lasix 40 mg oral tablet: 1 tab(s) orally once a day  metFORMIN 500 mg oral tablet: 1 tab(s) orally 2 times a day  Plavix 75 mg oral tablet: 1 tab(s) orally once a day  pravastatin 40 mg oral tablet: 1 tab(s) orally once a day

## 2023-01-05 VITALS
RESPIRATION RATE: 17 BRPM | DIASTOLIC BLOOD PRESSURE: 83 MMHG | TEMPERATURE: 98 F | HEART RATE: 63 BPM | SYSTOLIC BLOOD PRESSURE: 155 MMHG | OXYGEN SATURATION: 98 %

## 2023-01-05 LAB — GLUCOSE BLDC GLUCOMTR-MCNC: 98 MG/DL — SIGNIFICANT CHANGE UP (ref 70–99)

## 2023-01-05 PROCEDURE — 84484 ASSAY OF TROPONIN QUANT: CPT

## 2023-01-05 PROCEDURE — 85025 COMPLETE CBC W/AUTO DIFF WBC: CPT

## 2023-01-05 PROCEDURE — 70486 CT MAXILLOFACIAL W/O DYE: CPT

## 2023-01-05 PROCEDURE — 99239 HOSP IP/OBS DSCHRG MGMT >30: CPT

## 2023-01-05 PROCEDURE — 80307 DRUG TEST PRSMV CHEM ANLYZR: CPT

## 2023-01-05 PROCEDURE — 82962 GLUCOSE BLOOD TEST: CPT

## 2023-01-05 PROCEDURE — 36415 COLL VENOUS BLD VENIPUNCTURE: CPT

## 2023-01-05 PROCEDURE — 84100 ASSAY OF PHOSPHORUS: CPT

## 2023-01-05 PROCEDURE — 93321 DOPPLER ECHO F-UP/LMTD STD: CPT

## 2023-01-05 PROCEDURE — 87637 SARSCOV2&INF A&B&RSV AMP PRB: CPT

## 2023-01-05 PROCEDURE — 82550 ASSAY OF CK (CPK): CPT

## 2023-01-05 PROCEDURE — 85379 FIBRIN DEGRADATION QUANT: CPT

## 2023-01-05 PROCEDURE — 74176 CT ABD & PELVIS W/O CONTRAST: CPT

## 2023-01-05 PROCEDURE — 86140 C-REACTIVE PROTEIN: CPT

## 2023-01-05 PROCEDURE — 85730 THROMBOPLASTIN TIME PARTIAL: CPT

## 2023-01-05 PROCEDURE — 83036 HEMOGLOBIN GLYCOSYLATED A1C: CPT

## 2023-01-05 PROCEDURE — 81001 URINALYSIS AUTO W/SCOPE: CPT

## 2023-01-05 PROCEDURE — 85610 PROTHROMBIN TIME: CPT

## 2023-01-05 PROCEDURE — 83880 ASSAY OF NATRIURETIC PEPTIDE: CPT

## 2023-01-05 PROCEDURE — 87086 URINE CULTURE/COLONY COUNT: CPT

## 2023-01-05 PROCEDURE — 99285 EMERGENCY DEPT VISIT HI MDM: CPT

## 2023-01-05 PROCEDURE — 72125 CT NECK SPINE W/O DYE: CPT

## 2023-01-05 PROCEDURE — 82728 ASSAY OF FERRITIN: CPT

## 2023-01-05 PROCEDURE — 97161 PT EVAL LOW COMPLEX 20 MIN: CPT

## 2023-01-05 PROCEDURE — 70551 MRI BRAIN STEM W/O DYE: CPT

## 2023-01-05 PROCEDURE — 82553 CREATINE MB FRACTION: CPT

## 2023-01-05 PROCEDURE — 86803 HEPATITIS C AB TEST: CPT

## 2023-01-05 PROCEDURE — 83735 ASSAY OF MAGNESIUM: CPT

## 2023-01-05 PROCEDURE — 80053 COMPREHEN METABOLIC PANEL: CPT

## 2023-01-05 PROCEDURE — 83605 ASSAY OF LACTIC ACID: CPT

## 2023-01-05 PROCEDURE — 70450 CT HEAD/BRAIN W/O DYE: CPT

## 2023-01-05 RX ADMIN — CLOPIDOGREL BISULFATE 75 MILLIGRAM(S): 75 TABLET, FILM COATED ORAL at 12:50

## 2023-01-05 RX ADMIN — APIXABAN 5 MILLIGRAM(S): 2.5 TABLET, FILM COATED ORAL at 05:28

## 2023-01-05 RX ADMIN — CARVEDILOL PHOSPHATE 25 MILLIGRAM(S): 80 CAPSULE, EXTENDED RELEASE ORAL at 12:50

## 2023-01-05 NOTE — PROGRESS NOTE ADULT - PROBLEM SELECTOR PLAN 2
CK uptrending from 1785 to 4285 s/p reported fall at home. Received 1L NS bolus in the ED.     - CK peaked at 5194 on 1/2 and now downtrending; most recent CK 3125   - c/w LR @100cc/hr   - frequent lung checks given ?CHF history     #Troponinemia   Trop-T elevated 0.03 on admission. EKG notable for afib w/ unifocal PVCs. Denies chest pain. Says history of stent placement in 2014. Possibly elevated i/s/o rhabdo picture above  - trops downtrending, most recent trops 0.02

## 2023-01-05 NOTE — PROGRESS NOTE ADULT - PROBLEM SELECTOR PLAN 5
Reports history of stent placement in 2014 with Dr. Cifuentes at [facility]. No clear CHF history but patient is on coreg 25mg BID and lasix 40mg daily. BNP elevated 2634.    - Held Lasix iso rhabdo  - obtain collateral

## 2023-01-05 NOTE — PROGRESS NOTE ADULT - ASSESSMENT
71 y/o Male PMHx CVA (R MCA in 2013), HTN, HLD, Afib (on eliquis), CAD s/p stent (2014), ?CHF (on lasix) who presents after a fall. Found to have sepsis 2/2 COVID along with rhabdomyolysis after a fall. Admitted to [facility] for further management. 71 y/o Male PMHx CVA (R MCA in 2013), HTN, HLD, Afib (on eliquis), CAD s/p stent (2014), ?CHF (on lasix) who presents after a fall. Found to have sepsis 2/2 COVID along with rhabdomyolysis after a fall. Admitted to [facility] for further management, stable, 24hr discharge notice given on 1/4/2023 at 4pm.

## 2023-01-05 NOTE — PROGRESS NOTE ADULT - SUBJECTIVE AND OBJECTIVE BOX
** INCOMPLETE **    OVERNIGHT EVENTS:     SUBJECTIVE:    VITAL SIGNS:  Vital Signs Last 24 Hrs  T(C): 37.4 (04 Jan 2023 20:44), Max: 37.4 (04 Jan 2023 20:44)  T(F): 99.3 (04 Jan 2023 20:44), Max: 99.3 (04 Jan 2023 20:44)  HR: 67 (04 Jan 2023 20:44) (67 - 72)  BP: 149/88 (04 Jan 2023 20:44) (149/88 - 166/78)  BP(mean): --  RR: 18 (04 Jan 2023 20:44) (18 - 18)  SpO2: 99% (04 Jan 2023 20:44) (99% - 99%)    Parameters below as of 04 Jan 2023 20:44  Patient On (Oxygen Delivery Method): room air        PHYSICAL EXAM:  General: NAD; speaking in full sentences  HEENT: NC/AT; PERRL; EOMI; MMM  Neck: supple; no JVD  Cardiac: RRR; +S1/S2  Pulm: CTA B/L; no W/R/R  GI: soft, NT/ND, +BS  Extremities:  no edema, clubbing or cyanosis  Vasc: 2+ radial, DP pulses B/L  Neuro: AAOx3; no focal deficits    MEDICATIONS:  MEDICATIONS  (STANDING):  apixaban 5 milliGRAM(s) Oral every 12 hours  carvedilol 25 milliGRAM(s) Oral every 12 hours  clopidogrel Tablet 75 milliGRAM(s) Oral daily  dextrose 5%. 1000 milliLiter(s) (100 mL/Hr) IV Continuous <Continuous>  dextrose 5%. 1000 milliLiter(s) (50 mL/Hr) IV Continuous <Continuous>  dextrose 50% Injectable 25 Gram(s) IV Push once  dextrose 50% Injectable 12.5 Gram(s) IV Push once  dextrose 50% Injectable 25 Gram(s) IV Push once  glucagon  Injectable 1 milliGRAM(s) IntraMuscular once  influenza  Vaccine (HIGH DOSE) 0.7 milliLiter(s) IntraMuscular once  lactated ringers. 1000 milliLiter(s) (100 mL/Hr) IV Continuous <Continuous>    MEDICATIONS  (PRN):  acetaminophen     Tablet .. 650 milliGRAM(s) Oral every 6 hours PRN Temp greater or equal to 38C (100.4F), Mild Pain (1 - 3)  dextrose Oral Gel 15 Gram(s) Oral once PRN Blood Glucose LESS THAN 70 milliGRAM(s)/deciliter      ALLERGIES:  Allergies    No Known Allergies    Intolerances        LABS:                        12.2   5.37  )-----------( 147      ( 03 Jan 2023 07:57 )             36.8     01-03    141  |  108  |  16  ----------------------------<  91  3.7   |  21<L>  |  0.97    Ca    8.9      03 Jan 2023 07:57  Phos  2.9     01-03  Mg     1.8     01-03    TPro  6.5  /  Alb  3.4  /  TBili  0.6  /  DBili  x   /  AST  73<H>  /  ALT  18  /  AlkPhos  58  01-03        RADIOLOGY & ADDITIONAL TESTS: Reviewed. OVERNIGHT EVENTS:   No acute events overnight.    SUBJECTIVE:  Pt seen and examined at bedside. Pt walking around room, in no acute distress. Unhappy with changing rooms 3 times, but feels okay.     VITAL SIGNS:  Vital Signs Last 24 Hrs  T(C): 37.4 (04 Jan 2023 20:44), Max: 37.4 (04 Jan 2023 20:44)  T(F): 99.3 (04 Jan 2023 20:44), Max: 99.3 (04 Jan 2023 20:44)  HR: 67 (04 Jan 2023 20:44) (67 - 72)  BP: 149/88 (04 Jan 2023 20:44) (149/88 - 166/78)  BP(mean): --  RR: 18 (04 Jan 2023 20:44) (18 - 18)  SpO2: 99% (04 Jan 2023 20:44) (99% - 99%)    Parameters below as of 04 Jan 2023 20:44  Patient On (Oxygen Delivery Method): room air        PHYSICAL EXAM:  General: NAD; speaking in full sentences  HEENT: NC/AT; PERRL; EOMI; MMM  Neck: supple; no JVD  Cardiac: RRR; +S1/S2  Pulm: CTA B/L; no W/R/R  GI: soft, NT/ND, +BS  Extremities: open abrasions to b/l knees; chronic venous stasis changes to b/l LEs  Vasc: 2+ radial, DP pulses B/L  Neuro: AAOx3; no focal deficits    MEDICATIONS:  MEDICATIONS  (STANDING):  apixaban 5 milliGRAM(s) Oral every 12 hours  carvedilol 25 milliGRAM(s) Oral every 12 hours  clopidogrel Tablet 75 milliGRAM(s) Oral daily  dextrose 5%. 1000 milliLiter(s) (100 mL/Hr) IV Continuous <Continuous>  dextrose 5%. 1000 milliLiter(s) (50 mL/Hr) IV Continuous <Continuous>  dextrose 50% Injectable 25 Gram(s) IV Push once  dextrose 50% Injectable 12.5 Gram(s) IV Push once  dextrose 50% Injectable 25 Gram(s) IV Push once  glucagon  Injectable 1 milliGRAM(s) IntraMuscular once  influenza  Vaccine (HIGH DOSE) 0.7 milliLiter(s) IntraMuscular once  lactated ringers. 1000 milliLiter(s) (100 mL/Hr) IV Continuous <Continuous>    MEDICATIONS  (PRN):  acetaminophen     Tablet .. 650 milliGRAM(s) Oral every 6 hours PRN Temp greater or equal to 38C (100.4F), Mild Pain (1 - 3)  dextrose Oral Gel 15 Gram(s) Oral once PRN Blood Glucose LESS THAN 70 milliGRAM(s)/deciliter      ALLERGIES:  Allergies    No Known Allergies    Intolerances        LABS:                        12.2   5.37  )-----------( 147      ( 03 Jan 2023 07:57 )             36.8     01-03    141  |  108  |  16  ----------------------------<  91  3.7   |  21<L>  |  0.97    Ca    8.9      03 Jan 2023 07:57  Phos  2.9     01-03  Mg     1.8     01-03    TPro  6.5  /  Alb  3.4  /  TBili  0.6  /  DBili  x   /  AST  73<H>  /  ALT  18  /  AlkPhos  58  01-03        RADIOLOGY & ADDITIONAL TESTS: Reviewed.

## 2023-01-05 NOTE — PROGRESS NOTE ADULT - PROBLEM SELECTOR PLAN 9
F: LR @ 100cc/hr for rhabdo   E: replete K<4, Mg<2  N: Dash/TLC, consistent carb  VTE Prophylaxis: eliquis 5mg BID  GI: pantoprazole  D: RMF > home with outpt PT F: prn  E: replete K<4, Mg<2  N: Dash/TLC, consistent carb  VTE Prophylaxis: eliquis 5mg BID  GI: pantoprazole  D: home with outpt PT; 24hr discharge notice given on 1/4

## 2023-01-05 NOTE — DISCHARGE NOTE NURSING/CASE MANAGEMENT/SOCIAL WORK - PATIENT PORTAL LINK FT
You can access the FollowMyHealth Patient Portal offered by [facility] by registering at the following website: [email or web address]. By joining [email or web address]’s FollowMyHealth portal, you will also be able to view your health information using other applications (apps) compatible with our system.

## 2023-01-05 NOTE — DISCHARGE NOTE NURSING/CASE MANAGEMENT/SOCIAL WORK - NSDCFUADDAPPT_GEN_ALL_CORE_FT
Please bring your Insurance card, Photo ID and Discharge paperwork to the following appointment:    (1) Please follow up with your Primary Care Provider, Dr. Michael Lezama at [address] [place] [address] on 01/17/2023 at 9:15am.    Appointment was scheduled by Ms. FLOR Grewal, Referral Coordinator.

## 2023-01-10 DIAGNOSIS — T79.6XXA TRAUMATIC ISCHEMIA OF MUSCLE, INITIAL ENCOUNTER: ICD-10-CM

## 2023-01-10 DIAGNOSIS — I10 ESSENTIAL (PRIMARY) HYPERTENSION: ICD-10-CM

## 2023-01-10 DIAGNOSIS — I48.20 CHRONIC ATRIAL FIBRILLATION, UNSPECIFIED: ICD-10-CM

## 2023-01-10 DIAGNOSIS — Z79.84 LONG TERM (CURRENT) USE OF ORAL HYPOGLYCEMIC DRUGS: ICD-10-CM

## 2023-01-10 DIAGNOSIS — U07.1 COVID-19: ICD-10-CM

## 2023-01-10 DIAGNOSIS — I69.398 OTHER SEQUELAE OF CEREBRAL INFARCTION: ICD-10-CM

## 2023-01-10 DIAGNOSIS — Z95.5 PRESENCE OF CORONARY ANGIOPLASTY IMPLANT AND GRAFT: ICD-10-CM

## 2023-01-10 DIAGNOSIS — Z79.01 LONG TERM (CURRENT) USE OF ANTICOAGULANTS: ICD-10-CM
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
## 2023-01-10 DIAGNOSIS — R77.8 OTHER SPECIFIED ABNORMALITIES OF PLASMA PROTEINS: ICD-10-CM

## 2023-01-10 DIAGNOSIS — E11.9 TYPE 2 DIABETES MELLITUS WITHOUT COMPLICATIONS: ICD-10-CM

## 2023-01-10 DIAGNOSIS — R27.8 OTHER LACK OF COORDINATION: ICD-10-CM

## 2023-01-10 DIAGNOSIS — I49.3 VENTRICULAR PREMATURE DEPOLARIZATION: ICD-10-CM

## 2023-01-10 DIAGNOSIS — A41.89 OTHER SPECIFIED SEPSIS: ICD-10-CM

## 2023-01-10 DIAGNOSIS — Z87.891 PERSONAL HISTORY OF NICOTINE DEPENDENCE: ICD-10-CM

## 2023-01-10 DIAGNOSIS — E78.5 HYPERLIPIDEMIA, UNSPECIFIED: ICD-10-CM

## 2023-01-10 DIAGNOSIS — I25.10 ATHEROSCLEROTIC HEART DISEASE OF NATIVE CORONARY ARTERY WITHOUT ANGINA PECTORIS: ICD-10-CM

## 2023-03-20 NOTE — PROGRESS NOTE ADULT - ASSESSMENT
73 y/o Male PMHx CVA (R MCA in 2013), HTN, HLD, Afib (on eliquis), CAD s/p stent (2014), ?CHF (on lasix) who presents after a fall. Found to have sepsis 2/2 COVID along with rhabdomyolysis after a fall. Admitted to [facility] for further management. Epidermal Autograft Text: The defect edges were debeveled with a #15 scalpel blade.  Given the location of the defect, shape of the defect and the proximity to free margins an epidermal autograft was deemed most appropriate.  Using a sterile surgical marker, the primary defect shape was transferred to the donor site. The epidermal graft was then harvested.  The skin graft was then placed in the primary defect and oriented appropriately.

## 2025-03-09 NOTE — PATIENT PROFILE ADULT - FALL HARM RISK - FACTORS NURSING JUDGEMENT
SUZANNE ZARCO     Chief Complaint   Patient presents with    Office Visit    Follow-up     RECHECK RIGHT FOOT ULCER       Subjective:  Suzanne presents for another follow-up visit regarding the ulceration subfifth metatarsal head of the right foot.  Since I saw him last, he was admitted for sepsis with the ulcer being thought of as the source.  He also tells me that he is transferring his care to [facility].  Today he denies fever, chills.  He reports no recent fluctuations in blood sugar.  He presents today with his wife.     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    Uncomplicated senile dementia  (CMD)        Past Surgical History:  Past Surgical History:   Procedure Laterality Date    Back surgery      T3-4 herniated disc    Cystoscopy      with stent    Esophagogastroduodenoscopy transoral flex diag      Incision and drainage      Skin surgery      skin lesion removed       Family History:  Family History   Problem Relation Age of Onset    Osteoarthritis Mother     Dementia/Alzheimers Mother     Diabetes Mother     Hypertension Mother     Hyperlipidemia Mother     Dementia/Alzheimers Father     Diabetes Father     Hypertension Father     Hyperlipidemia Father     Substance Abuse Father     Obesity Sister     Substance Abuse Brother        Medications:  Current Outpatient Medications   Medication Sig Dispense Refill    acetaminophen (TYLENOL) 325 MG tablet Take 650 mg by mouth every 6 hours as needed.      polyethylene glycol (MIRALAX) 17 g packet Take  17 g by mouth daily.      docusate sodium-sennosides (Senokot S) 50-8.6 MG per tablet Take 2 tablets by mouth every 24 hours as needed.      insulin glargine-yfgn 100 UNIT/ML Solution Inject 10 Units into the skin daily.      Insulin Lispro, 1 Unit Dial, (HumaLOG KwikPen) 100 UNIT/ML pen-injector Inject under the skin 3 (three) times daily with meals. Sliding scale: 159-199 = 1 unit, 200-250 = 2 units, 251-300= 3 units, 301+ = 4 units      potassium CHLORIDE (KLOR-CON) 20 MEQ packet Take 20 mEq by mouth daily.      furosemide (LASIX) 20 MG tablet Take 2 tablets by mouth daily.      insulin glargine (Lantus SoloStar) 100 UNIT/ML pen-injector Inject 15 Units into the skin in the morning and 15 Units in the evening.      lidocaine (LIDOCARE) 4 % patch Place 1 patch onto the skin daily.      pantoprazole (PROTONIX) 40 MG tablet Take 1 tablet by mouth daily (before breakfast).      sodium hypochlorite (DAKINS) 0.125 % Solution Apply 1 Units topically daily.      tamsulosin (FLOMAX) 0.4 MG Cap Take 1 capsule by mouth daily after a meal.      DAPTOmycin (CUBICIN) 500 MG injection Inject 880 mg into the vein every 24 hours for 38 doses. Begin taking on January 31, 2025.      HYDROcodone-acetaminophen (NORCO) 5-325 MG per tablet Take 1 tablet by mouth every 6 hours as needed for Pain. 20 tablet 0    metOLazone (ZAROXOLYN) 2.5 MG tablet Take 2.5 mg by mouth daily.      valsartan (DIOVAN) 80 MG tablet Take 80 mg by mouth daily.      polyvinyl alcohol (LIQUITEARS) 1.4 % ophthalmic solution Place 1 drop into both eyes as needed.      silver sulfADIAZINE (THERMAZENE) 1 % cream Apply 1 application. topically in the morning and 1 application. in the evening. Apply to a thickness of 1/16 inch (\"nickel thick\"). 50 g 0    ammonium lactate (AMLACTIN) 12 % lotion Apply topically 2 times daily. 400 g 1    albuterol 108 (90 Base) MCG/ACT inhaler Inhale 2 puffs into the lungs 4 times daily as needed.       No current facility-administered  medications for this visit.       Allergies:  is allergic to theophylline and theodrenaline.    Social History:   reports that he has never smoked. He has never used smokeless tobacco. He reports that he does not drink alcohol and does not use drugs.   reports no history of alcohol use.   reports no history of drug use.   reports that he has never smoked. He has never used smokeless tobacco.    ROS:  General:  Patient denies fever/chills/nausea/vomiting/generalized weakness or fatigue.  HEENT:  Patient denies headache, dizziness, vision problems, hearing loss/deficit.  Cardiovascular:  Patient denies chest discomfort, fainting  Respiratory:  Patient denies shortness of breath, cough, wheezing  GI:  Patient denies diarrhea/constipation, acid reflux  :  Patient denies hematuria, nocturia, polyuria, dysuria  Integumentary:  Patient denies lesions, rashes on other parts of the body other than chief complaint  Musculoskeletal:  Patient denies pain, swelling, stiffness of significant other than chief complaint.   Neurological:  Patient denies chronic headache, seizures of significant other than chief complaint.  Psychiatric:  Patient denies feeling anxious, chemical dependency other than chief complaint.    EXAM:  The patient is alert and oriented and in no apparent distress with a normal mood and affect. The patient is cooperative with the examination.  No issues with hearing noted. No labored breathing present evaluation.        LOWER EXTREMITY PHYSICAL EXAM:    Dermatological Examination:    Elongated, thickened, yellow, crumbly, dystrophic and incurvated nails.   No fluctance, drainage from either foot/leg.   No soft tissue crepitus.  Pedal skin is very dry along the plantar aspects of both feet with areas of scaling.    The ulceration subfifth metatarsal head of the right foot remains present measuring 9 mm x 8 mm x 4 mm.  There is no obvious bone exposure but the ulceration does probe in close proximity to the  fifth metatarsal head and joint.  There is no active purulence from the area, but there is surrounding warmth and erythema consistent with localized celluliti wound erythema.  No active sign of infection.       Vascular Examination:    (1/4) dorsalis pedis right    (1/4) dorsalis pedis left   (1/4) posterior tibial right   (1/4) posterior tibial left   CFT less than 3 seconds.  +2- 3 chronic lymph edema is present at the lower leg bilateral    Neurological Examination:   The light touch, sharp/dull sensations are diminished and symmetrical.   Diminished sensation utilizing a 5.07/10 gm monofilatment wire from digits to midfoot.   The patient's response to deep pain is intact.    Orthopedic/(Visual Inspection):  The ankle, subtalar, and metatarsal phalangeal joints all exhibited range of motion without pain or crepitus.   The muscle strength of dorsiflexors, plantar flexors, inverters and everters of the foot were all 5/5, without any evidence of muscle atrophy, abnormal movements or spasticity.   Exam of both lower extremities was unremarkable for any evidence of dislocations, subluxation or laxity.   The structural deformities that were noted included moderate hammertoes.    X-ray: 3 views of the right foot were obtained today.  Please see radiology report.      Assessment:   Encounter Diagnoses   Name Primary?    Right foot ulcer, with fat layer exposed  (CMD) Yes    Diabetic polyneuropathy associated with type 2 diabetes mellitus  (CMD)     Acute osteomyelitis of metatarsal bone of right foot  (CMD)            Plan:   Clinical and radiographic exam.  Yoel, his wife and I had a lengthy discussion regarding treatment options.  I explained that the most appropriate approach at this point is to perform surgery to remove the fifth metatarsal head and also amputate the fifth digit.  Primary closure of the wound can also be accomplished by this.  They both tell me that other providers that they have seen while being  admitted in various hospitals concur with the same evaluation and plan.  They are going to follow-up at [facility].  They are to continue with current treatment plan.  He is receiving antibiotics managed by infectious disease.  He was encouraged to continue with the surgical shoe and offloading insole.  He is certainly encouraged to contact me with any further questions or concerns.  Otherwise, he will be seen as needed.         Yes